# Patient Record
Sex: FEMALE | Race: WHITE | NOT HISPANIC OR LATINO | Employment: FULL TIME | URBAN - METROPOLITAN AREA
[De-identification: names, ages, dates, MRNs, and addresses within clinical notes are randomized per-mention and may not be internally consistent; named-entity substitution may affect disease eponyms.]

---

## 2017-03-17 ENCOUNTER — OFFICE VISIT (OUTPATIENT)
Dept: FAMILY MEDICINE | Facility: CLINIC | Age: 32
End: 2017-03-17
Payer: COMMERCIAL

## 2017-03-17 VITALS
OXYGEN SATURATION: 97 % | TEMPERATURE: 97.2 F | DIASTOLIC BLOOD PRESSURE: 66 MMHG | HEIGHT: 65 IN | SYSTOLIC BLOOD PRESSURE: 108 MMHG | HEART RATE: 59 BPM | WEIGHT: 175.4 LBS | BODY MASS INDEX: 29.22 KG/M2

## 2017-03-17 DIAGNOSIS — M25.511 ARTHRALGIA OF RIGHT ACROMIOCLAVICULAR JOINT: ICD-10-CM

## 2017-03-17 DIAGNOSIS — Z01.419 WELL WOMAN EXAM WITH ROUTINE GYNECOLOGICAL EXAM: Primary | ICD-10-CM

## 2017-03-17 PROCEDURE — G0145 SCR C/V CYTO,THINLAYER,RESCR: HCPCS | Performed by: FAMILY MEDICINE

## 2017-03-17 PROCEDURE — 99395 PREV VISIT EST AGE 18-39: CPT | Performed by: FAMILY MEDICINE

## 2017-03-17 PROCEDURE — 87624 HPV HI-RISK TYP POOLED RSLT: CPT | Performed by: FAMILY MEDICINE

## 2017-03-17 NOTE — NURSING NOTE
"Chief Complaint   Patient presents with     Physical     FASTING       Initial /66  Pulse 59  Temp 97.2  F (36.2  C) (Tympanic)  Ht 5' 5.25\" (1.657 m)  Wt 175 lb 6.4 oz (79.6 kg)  LMP 03/11/2017 (Exact Date)  SpO2 97%  BMI 28.96 kg/m2 Estimated body mass index is 28.96 kg/(m^2) as calculated from the following:    Height as of this encounter: 5' 5.25\" (1.657 m).    Weight as of this encounter: 175 lb 6.4 oz (79.6 kg).  Medication Reconciliation: complete  "

## 2017-03-17 NOTE — PATIENT INSTRUCTIONS
Next physical in 2-3 years I would recommend to do fasting cholesterol and blood sugar.    Right shoulder you are a little tender on the AC joint so may have irritated or over stretched that or the bicep at the elbow.     Preventive Health Recommendations  Female Ages 26 - 39  Yearly exam:   See your health care provider every year in order to    Review health changes.     Discuss preventive care.      Review your medicines if you your doctor has prescribed any.    Until age 30: Get a Pap test every three years (more often if you have had an abnormal result).    After age 30: Talk to your doctor about whether you should have a Pap test every 3 years or have a Pap test with HPV screening every 5 years.   You do not need a Pap test if your uterus was removed (hysterectomy) and you have not had cancer.  You should be tested each year for STDs (sexually transmitted diseases), if you're at risk.   Talk to your provider about how often to have your cholesterol checked.  If you are at risk for diabetes, you should have a diabetes test (fasting glucose).  Shots: Get a flu shot each year. Get a tetanus shot every 10 years.   Nutrition:     Eat at least 5 servings of fruits and vegetables each day.    Eat whole-grain bread, whole-wheat pasta and brown rice instead of white grains and rice.    Talk to your provider about Calcium and Vitamin D.     Lifestyle    Exercise at least 150 minutes a week (30 minutes a day, 5 days of the week). This will help you control your weight and prevent disease.    Limit alcohol to one drink per day.    No smoking.     Wear sunscreen to prevent skin cancer.    See your dentist every six months for an exam and cleaning.        Thank you for choosing [facility].  You may be receiving a survey in the mail from Vitaly Snapkinjericho regarding your visit today.  Please take a few minutes to complete and return the survey to let us know how we are doing.      If you have questions or concerns, please  contact us via [employer] or you can contact your care team at [phone].    Our Clinic hours are:  Monday 6:40 am  to 7:00 pm  Tuesday -Friday 6:40 am to 5:00 pm    The [place] outpatient lab hours are:  Monday - Friday 6:10 am to 4:45 pm  Saturdays 7:00 am to 11:00 am  Appointments are required, call [phone]    If you have clinical questions after hours or would like to schedule an appointment,  call the clinic at [phone].

## 2017-03-17 NOTE — MR AVS SNAPSHOT
After Visit Summary   3/17/2017    Amarilis Espinal    MRN: [id number]           Patient Information     Date Of Birth          1985        Visit Information        Provider Department      3/17/2017 6:40 AM Dmitry Barrett MD [facility]        Today's Diagnoses     Well woman exam with routine gynecological exam    -  1      Care Instructions    Next physical in 2-3 years I would recommend to do fasting cholesterol and blood sugar.    Right shoulder you are a little tender on the AC joint so may have irritated or over stretched that or the bicep at the elbow.     Preventive Health Recommendations  Female Ages 26 - 39  Yearly exam:   See your health care provider every year in order to    Review health changes.     Discuss preventive care.      Review your medicines if you your doctor has prescribed any.    Until age 30: Get a Pap test every three years (more often if you have had an abnormal result).    After age 30: Talk to your doctor about whether you should have a Pap test every 3 years or have a Pap test with HPV screening every 5 years.   You do not need a Pap test if your uterus was removed (hysterectomy) and you have not had cancer.  You should be tested each year for STDs (sexually transmitted diseases), if you're at risk.   Talk to your provider about how often to have your cholesterol checked.  If you are at risk for diabetes, you should have a diabetes test (fasting glucose).  Shots: Get a flu shot each year. Get a tetanus shot every 10 years.   Nutrition:     Eat at least 5 servings of fruits and vegetables each day.    Eat whole-grain bread, whole-wheat pasta and brown rice instead of white grains and rice.    Talk to your provider about Calcium and Vitamin D.     Lifestyle    Exercise at least 150 minutes a week (30 minutes a day, 5 days of the week). This will help you control your weight and prevent disease.    Limit alcohol to one drink per day.    No smoking.      Wear sunscreen to prevent skin cancer.    See your dentist every six months for an exam and cleaning.        Thank you for choosing [facility].  You may be receiving a survey in the mail from Vitaly Zaldivar regarding your visit today.  Please take a few minutes to complete and return the survey to let us know how we are doing.      If you have questions or concerns, please contact us via [employer] or you can contact your care team at [phone].    Our Clinic hours are:  Monday 6:40 am  to 7:00 pm  Tuesday -Friday 6:40 am to 5:00 pm    The [place] outpatient lab hours are:  Monday - Friday 6:10 am to 4:45 pm  Saturdays 7:00 am to 11:00 am  Appointments are required, call [phone]    If you have clinical questions after hours or would like to schedule an appointment,  call the clinic at [phone].        Follow-ups after your visit        Who to contact     If you have questions or need follow up information about today's clinic visit or your schedule please contact [facility] directly at [phone].  Normal or non-critical lab and imaging results will be communicated to you by [employer], letter or phone within 4 business days after the clinic has received the results. If you do not hear from us within 7 days, please contact the clinic through [employer] or phone. If you have a critical or abnormal lab result, we will notify you by phone as soon as possible.  Submit refill requests through [employer] or call your pharmacy and they will forward the refill request to us. Please allow 3 business days for your refill to be completed.          Additional Information About Your Visit        License [employer] Information     [employer] gives you secure access to your electronic health record. If you see a primary care provider, you can also send messages to your care team and make appointments. If you have questions, please call your primary care clinic.  If you do not have a primary care provider, please call  "[phone] and they will assist you.        Care EveryWhere ID     This is your Care EveryWhere ID. This could be used by other organizations to access your [facility] medical records  [id number]        Your Vitals Were     Pulse Temperature Height Last Period Pulse Oximetry BMI (Body Mass Index)    59 97.2  F (36.2  C) (Tympanic) 5' 5.25\" (1.657 m) 03/11/2017 (Exact Date) 97% 28.96 kg/m2       Blood Pressure from Last 3 Encounters:   03/17/17 108/66   08/05/16 119/71   07/25/16 (!) 123/91    Weight from Last 3 Encounters:   03/17/17 175 lb 6.4 oz (79.6 kg)   08/19/16 168 lb (76.2 kg)   08/05/16 164 lb (74.4 kg)              We Performed the Following     HPV High Risk Types DNA Cervical     Pap imaged thin layer screen with HPV - recommended age 30 - 65 years (select HPV order below)        Primary Care Provider Office Phone #    [facility] [phone] [address] [phone]        Thank you!     Thank you for choosing [facility]  for your care. Our goal is always to provide you with excellent care. Hearing back from our patients is one way we can continue to improve our services. Please take a few minutes to complete the written survey that you may receive in the mail after your visit with us. Thank you!             Your Updated Medication List - Protect others around you: Learn how to safely use, store and throw away your medicines at www.disposemymeds.org.          This list is accurate as of: 3/17/17  7:19 AM.  Always use your most recent med list.                   Brand Name Dispense Instructions for use    EPINEPHrine 0.3 MG/0.3ML injection     1 each    Inject 0.3 mLs (0.3 mg) into the muscle once as needed for anaphylaxis       IBUPROFEN PO      Take 200 mg by mouth       IRON SUPPLEMENT PO      Take 325 mg by mouth daily.       OMEGA-3 FISH OIL PO      Take 1 g by mouth daily.       VITAMIN D3 PO      Take by mouth daily         "

## 2017-03-17 NOTE — PROGRESS NOTES
SUBJECTIVE:     CC: Amarilis Espinal is an 31 year old woman who presents for preventive health visit.     Healthy Habits:    Do you get at least three servings of calcium containing foods daily (dairy, green leafy vegetables, etc.)? yes    Amount of exercise or daily activities, outside of work: 5 day(s) per week    Problems taking medications regularly not applicable    Medication side effects: No    Have you had an eye exam in the past two years? yes    Do you see a dentist twice per year? yes    Do you have sleep apnea, excessive snoring or daytime drowsiness?no        PROBLEMS TO ADD ON...pinched nerve in right shoulder, doing crossfit; did acupuncture which helped last fall.  Felt a pull with doing a kip swing on the right antecubital area then after that workout did not hurt then next workout pain in the right wrist volar up to antecubital fossa then large bruise on top of the shoulder the next few days where nothing had hit.     Today's PHQ-2 Score:   PHQ-2 ( 1999 Pfizer) 3/17/2017 7/18/2016   Q1: Little interest or pleasure in doing things 0 0   Q2: Feeling down, depressed or hopeless 0 0   PHQ-2 Score 0 0       Abuse: Current or Past(Physical, Sexual or Emotional)- No  Do you feel safe in your environment - Yes    Social History   Substance Use Topics     Smoking status: Never Smoker     Smokeless tobacco: Never Used     Alcohol use No      Comment: rare-quit with pregnancy     The patient does not drink >3 drinks per day nor >7 drinks per week.    Recent Labs   Lab Test  07/10/12   1421   CHOL  155   LDL  86       Reviewed orders with patient.  Reviewed health maintenance and updated orders accordingly - Yes    Mammo Decision Support:  Mammogram not appropriate for this patient based on age.    Pertinent mammograms are reviewed under the imaging tab.  History of abnormal Pap smear: NO - age 30-65 PAP every 5 years with negative HPV co-testing recommended    Reviewed and updated as needed this visit by  "clinical staff  Tobacco  Allergies  Med Hx  Surg Hx  Fam Hx  Soc Hx        Reviewed and updated as needed this visit by Provider          ROS:  C: NEGATIVE for fever, chills, change in weight  I: NEGATIVE for worrisome rashes, moles or lesions  E: NEGATIVE for vision changes or irritation  ENT: NEGATIVE for ear, mouth and throat problems  R: NEGATIVE for significant cough or SOB  B: NEGATIVE for masses, tenderness or discharge  CV: NEGATIVE for chest pain, palpitations or peripheral edema  GI: NEGATIVE for nausea, abdominal pain, heartburn, or change in bowel habits  : NEGATIVE for unusual urinary or vaginal symptoms.   M: NEGATIVE for significant arthralgias or myalgia  N: NEGATIVE for weakness, dizziness or paresthesias  P: NEGATIVE for changes in mood or affect    Problem list, Medication list, Allergies, and Medical/Social/Surgical histories reviewed in EPIC and updated as appropriate.  OBJECTIVE:     /66  Pulse 59  Temp 97.2  F (36.2  C) (Tympanic)  Ht 5' 5.25\" (1.657 m)  Wt 175 lb 6.4 oz (79.6 kg)  LMP 03/11/2017 (Exact Date)  SpO2 97%  BMI 28.96 kg/m2  EXAM:  GENERAL: healthy, alert and no distress  EYES: Eyes grossly normal to inspection, PERRL and conjunctivae and sclerae normal  HENT: ear canals and TM's normal, nose and mouth without ulcers or lesions  NECK: no adenopathy, no asymmetry, masses, or scars and thyroid normal to palpation  RESP: lungs clear to auscultation - no rales, rhonchi or wheezes  BREAST: normal without masses, tenderness or nipple discharge and no palpable axillary masses or adenopathy  CV: regular rate and rhythm, normal S1 S2, no S3 or S4, no murmur, click or rub, no peripheral edema and peripheral pulses strong  ABDOMEN: soft, nontender, no hepatosplenomegaly, no masses and bowel sounds normal   (female): normal female external genitalia, normal urethral meatus, vaginal mucosa pink, moist, well rugated, and normal cervix/adnexa/uterus without masses or discharge  MS: " "no gross musculoskeletal defects noted, no edema  SKIN: no suspicious lesions or rashes  NEURO: Normal strength and tone, mentation intact and speech normal  PSYCH: mentation appears normal, affect normal/bright    ASSESSMENT/PLAN:     Amarilis was seen today for physical.    Diagnoses and all orders for this visit:    Well woman exam with routine gynecological exam  -     Pap imaged thin layer screen with HPV - recommended age 30 - 65 years (select HPV order below)  -     HPV High Risk Types DNA Cervical    Arthralgia of right acromioclavicular joint: now resolved with rest from activity        COUNSELING:   Reviewed preventive health counseling, as reflected in patient instructions       Regular exercise       Healthy diet/nutrition       Vision screening         reports that she has never smoked. She has never used smokeless tobacco.    Estimated body mass index is 28.96 kg/(m^2) as calculated from the following:    Height as of this encounter: 5' 5.25\" (1.657 m).    Weight as of this encounter: 175 lb 6.4 oz (79.6 kg).   Weight management plan: Discussed healthy diet and exercise guidelines and patient will follow up in 12 months in clinic to re-evaluate. Doing crossfit at least 3 times a week    Counseling Resources:  ATP IV Guidelines  Pooled Cohorts Equation Calculator  Breast Cancer Risk Calculator  FRAX Risk Assessment  ICSI Preventive Guidelines  Dietary Guidelines for Americans, 2010  USDA's MyPlate  ASA Prophylaxis  Lung CA Screening    Dmitry Barrett MD  [facility]  "

## 2017-03-21 LAB
COPATH REPORT: NORMAL
PAP: NORMAL

## 2017-03-22 LAB
FINAL DIAGNOSIS: NORMAL
HPV HR 12 DNA CVX QL NAA+PROBE: NEGATIVE
HPV16 DNA SPEC QL NAA+PROBE: NEGATIVE
HPV18 DNA SPEC QL NAA+PROBE: NEGATIVE
SPECIMEN DESCRIPTION: NORMAL

## 2017-04-08 ENCOUNTER — TELEPHONE (OUTPATIENT)
Dept: NURSING | Facility: CLINIC | Age: 32
End: 2017-04-08

## 2017-04-08 NOTE — TELEPHONE ENCOUNTER
Call Type: Triage Call    Presenting Problem: Amarilis states she is sure she has shingles.  Also, She says that the lymph node in her groin area is swollen. She  says she has a rash that is painful on her right hip, started about  a week ago. Were fluid filled but have dried up without draining or  crusting. Denies fever. Amarilis states that the shingles/rash is  improving and is not bothering her. She is more concerned about the  swollen lymph node.  Triage Note:  Guideline Title: Skin Lesions  Recommended Disposition: See Provider within 24 hours  Original Inclination: Wanted to speak with a nurse  Override Disposition:  Intended Action: See /Make Appt  Physician Contacted: No  New onset of painful blisters without an obvious cause such as thermal or chemical  burns ?  YES  Thermal or chemical burn ? NO  Blisters on mouth OR surrounding area ? NO  Known or suspected exposure to Poison Laya, O[place] OR [place] ? NO  Skin tear(s) caused by friction/shear injury ? NO  Previously confirmed diagnosis of athlete's foot and similar symptoms ? NO  One or more enlarged or tender lymph nodes ? NO  Associated with new onset wheezing or difficulty breathing ? NO  Exposure to , e.g., Super Glue ? NO  Possible exposure to chickenpox or has rash that looks like chickenpox ? NO  Female with any rash, warts or sores on perineal/perianal area. ? NO  Any painful blisters on perineal/perianal area. ? NO  Male with rash, warts, or sores on penis. ? NO  Male with rash, warts, or sores on scrotum/genital area. ? NO  Non-blister lesions on mouth, lip, tongue ? NO  Skin changes that looks like hives (itchy welts or wheals) ? NO  Jaundice (yellowish tint to skin or whites of eyes) that is worsening or not  previously evaluated. ? NO  Large areas of peeling skin or new onset of large blisters over body not related  to sun/UVB exposure ? NO  Foot or toe skin lesions complicated by diagnosed diabetes mellitus. ? NO  Painful lesion(s)  unrelieved with 24 hours of home care measures ? NO  Localized or widespread rash that does not have the appearance of hives (itchy  welts or wheals) ? NO  Any temperature elevation in an immunocompromised individual OR frail elderly with  signs of dehydration ? NO  Any new OR worsening signs and symptoms of soft tissue infection ? NO  Known herpes zoster or undiagnosed blister-like rash on or near eye ? NO  Known herpes zoster or undiagnosed blister-like rash on tip of nose ? NO  Physician Instructions:  Care Advice: Do not touch, scratch, or break blisters.  Intact blisters are  thought to reduce pain and decrease chance of secondary infection. Cover  blisters until they are crusted over. Apply  cool wet compresses to the  affected area for 10-15 minutes to help relieve pain and itching.  Analgesic/Antipyretic Advice - NSAIDs: Consider aspirin, ibuprofen,  naproxen or ketoprofen for pain or fever as directed on label or by  pharmacist/provider. PRECAUTIONS: - You should not take this medicine for  more than 10 days unless recommended by your provider. EXCEPTIONS: - Should  not be used if taking blood thinners or have bleeding problems. - Do not  use if have history of sensitivity/allergy to any of these medications  or history of cardiovascular, ulcer, kidney, liver disease or diabetes  unless approved by provider. - Do not exceed recommended dose or frequency.

## 2017-05-17 ENCOUNTER — HOSPITAL ENCOUNTER (EMERGENCY)
Facility: CLINIC | Age: 32
Discharge: HOME | End: 2017-05-17
Attending: EMERGENCY MEDICINE | Admitting: EMERGENCY MEDICINE
Payer: COMMERCIAL

## 2017-05-17 VITALS
SYSTOLIC BLOOD PRESSURE: 113 MMHG | HEIGHT: 65 IN | OXYGEN SATURATION: 97 % | WEIGHT: 174.25 LBS | BODY MASS INDEX: 29.03 KG/M2 | RESPIRATION RATE: 16 BRPM | DIASTOLIC BLOOD PRESSURE: 66 MMHG | TEMPERATURE: 98.3 F
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
## 2017-05-17 PROCEDURE — 99283 EMERGENCY DEPT VISIT LOW MDM: CPT | Performed by: EMERGENCY MEDICINE

## 2017-05-17 PROCEDURE — 99282 EMERGENCY DEPT VISIT SF MDM: CPT

## 2017-05-17 NOTE — ED AVS SNAPSHOT
[facility] Emergency Department    [address] [address]    Phone:  [phone]    Fax:  [phone]                                       Amarilis Espinal   MRN: [id number]    Department:  [facility] Emergency Department   Date of Visit:  5/17/2017           Patient Information     Date Of Birth          1985        Your diagnoses for this visit were:     Insect bite, initial encounter        You were seen by Fabrizio Monte MD.        Discharge Instructions         Insect Bite  Insects most often bite to protect themselves or their nests. Certain bugs, like fleas, bite to feed. In some cases, the actual bite causes no pain. An itchy red welt or swelling may develop at the site of the bite. Most insect bites do not cause illness. And the itching and swelling most often go away without treatment. However, an infection can develop if the bite is scratched and the skin broken. Rarely, a person may have an allergic reaction to an insect bite.  If a stinger is visible at the bite spot, remove it if possible, as this can decrease the amount of venom that gets into your body. Do not try to dig it out, as you may damage the skin and also increase the chance of infection.     To help reduce swelling and itching, apply ice or a cold pack wrapped in a thin towel.   Home care    Your health care provider may prescribe over-the-counter medicines to help relieve itching and swelling. Use each medicine according to the directions on the package. If the bite becomes infected, you will need an antibiotic. This may be in pill form taken by mouth or as an ointment or cream put directly on the skin. Be sure to use them exactly as prescribed.    Bite symptoms usually go away on their own within a week or two.    To help prevent infection, avoid scratching or picking at the bite.    To help relieve itching and swelling, apply ice wrapped in a thin towel to the bites. Do this for up to 10 minutes  at a time. Avoid hot showers or baths as these tend to make itching worse.    An over-the-counter anti-itch medicine such as calamine lotion or an antihistamine cream may be helpful.    If you suspect you have insects in your home, talk to a licensed pest-control professional. He or she can inspect your home and tell you how to get rid of bugs safely.  Follow-up care  Follow up with your health care provider, or as advised.  Call 911  Call 911 if any of these occur:    Trouble breathing or swallowing    Wheezing    Feeling like your throat is closing up    Fainting, loss of consciousness    Swelling around the face or mouth  When to seek medical advice  Call your health care provider right away if any of these occur:    Fever of 100.4 F (38 C) or higher    Signs of infection, such as increased swelling and pain, warmth, red streaks, or drainage from the skin    Signs of allergic reaction, such as hives, a spreading rash, throat itching    9314-4353 [employer] [address] [place] [address]. All rights reserved. This information is not intended as a substitute for professional medical care. Always follow your healthcare professional's instructions.      Benadryl, drink plenty of fluids, return for trouble swallowing, breathing, swelling of the throat or tongue, vomiting or any other concern.    24 Hour Appointment Hotline       To make an appointment at any [facility], call [phone] ([phone]). If you don't have a family doctor or clinic, we will help you find one. [place] clinics are conveniently located to serve the needs of you and your family.             Review of your medicines      Our records show that you are taking the medicines listed below. If these are incorrect, please call your family doctor or clinic.        Dose / Directions Last dose taken    EPINEPHrine 0.3 MG/0.3ML injection   Dose:  0.3 mg   Quantity:  1 each        Inject 0.3 mLs (0.3 mg) into the  muscle once as needed for anaphylaxis   Refills:  1        IBUPROFEN PO   Dose:  200 mg        Take 200 mg by mouth   Refills:  0        IRON SUPPLEMENT PO   Dose:  325 mg        Take 325 mg by mouth daily.   Refills:  0        OMEGA-3 FISH OIL PO   Dose:  1 g        Take 1 g by mouth daily.   Refills:  0        VITAMIN D3 PO        Take by mouth daily   Refills:  0                Orders Needing Specimen Collection     None      Pending Results     No orders found from 5/15/2017 to 5/18/2017.            Pending Culture Results     No orders found from 5/15/2017 to 5/18/2017.            Pending Results Instructions     If you had any lab results that were not finalized at the time of your Discharge, you can call the ED Lab Result RN at [phone]. You will be contacted by this team for any positive Lab results or changes in treatment. The nurses are available 7 days a week from 10A to 6:30P.  You can leave a message 24 hours per day and they will return your call.        Test Results From Your Hospital Stay               Thank you for choosing [facility]       Thank you for choosing [facility] for your care. Our goal is always to provide you with excellent care. Hearing back from our patients is one way we can continue to improve our services. Please take a few minutes to complete the written survey that you may receive in the mail after you visit with us. Thank you!        [employer] Information     [employer] gives you secure access to your electronic health record. If you see a primary care provider, you can also send messages to your care team and make appointments. If you have questions, please call your primary care clinic.  If you do not have a primary care provider, please call [phone] and they will assist you.        Care EveryWhere ID     This is your Care EveryWhere ID. This could be used by other organizations to access your [facility] medical records  [id number]        After Visit Summary       This is your  record. Keep this with you and show to your community pharmacist(s) and doctor(s) at your next visit.

## 2017-05-17 NOTE — ED AVS SNAPSHOT
[facility] Emergency Department    [address] [facility] [address]    Phone:  [phone]    Fax:  [phone]                                       Amarilis Espinal   MRN: [id number]    Department:  [facility] Emergency Department   Date of Visit:  5/17/2017           After Visit Summary Signature Page     I have received my discharge instructions, and my questions have been answered. I have discussed any challenges I see with this plan with the nurse or doctor.    ..........................................................................................................................................  Patient/Patient Representative Signature      ..........................................................................................................................................  Patient Representative Print Name and Relationship to Patient    ..................................................               ................................................  Date                                            Time    ..........................................................................................................................................  Reviewed by Signature/Title    ...................................................              ..............................................  Date                                                            Time

## 2017-05-18 NOTE — ED NOTES
Mother states that her  daughter has been saying she is going to kill herself today. No past Hx of this. Recent break up wioth a boyfriend. Also recent death ofd a sibling.

## 2017-05-18 NOTE — ED PROVIDER NOTES
"Chief complaint insect sting    31-year-old female stung in the right shoulder by an unknown insect approximately 8:30 this evening.  She describes shooting pains from the area.  She denies any near-syncope, shortness of air, nausea or vomiting.  She has history of anaphylaxis to bee sting in the past.  She did not take any medications after the insect bite.    Past medical history, medications, allergies, social history, family history all reviewed.  Patient Active Problem List   Diagnosis     Migraine headache     Lumbago     AR (allergic rhinitis)     Lactose intolerance     Problem focused review of systems otherwise negative    /81  Temp 98.3  F (36.8  C) (Oral)  Resp 16  Ht 1.651 m (5' 5\")  Wt 79 kg (174 lb 4 oz)  SpO2 98%  BMI 29 kg/m2  Nontoxic appearing no respiratory distress alert and oriented ×3  Head atraumatic normocephalic  Oropharynx moist without lesions or erythema exudate or angioedema  Skin pink warm and dry without rash with the exception of the right shoulder which shows a dime size area of erythema without significant induration, this area is moderately tender.  Lungs clear no rales or wheezes  Heart regular no murmur  Abdomen soft nontender bowel sounds positive    Impression: Insect sting  Plan: Benadryl, drink plenty of fluids, return criteria reviewed.     Fabrizio Monte MD  05/17/17 0000    "

## 2017-05-18 NOTE — DISCHARGE INSTRUCTIONS
Insect Bite  Insects most often bite to protect themselves or their nests. Certain bugs, like fleas, bite to feed. In some cases, the actual bite causes no pain. An itchy red welt or swelling may develop at the site of the bite. Most insect bites do not cause illness. And the itching and swelling most often go away without treatment. However, an infection can develop if the bite is scratched and the skin broken. Rarely, a person may have an allergic reaction to an insect bite.  If a stinger is visible at the bite spot, remove it if possible, as this can decrease the amount of venom that gets into your body. Do not try to dig it out, as you may damage the skin and also increase the chance of infection.     To help reduce swelling and itching, apply ice or a cold pack wrapped in a thin towel.   Home care    Your health care provider may prescribe over-the-counter medicines to help relieve itching and swelling. Use each medicine according to the directions on the package. If the bite becomes infected, you will need an antibiotic. This may be in pill form taken by mouth or as an ointment or cream put directly on the skin. Be sure to use them exactly as prescribed.    Bite symptoms usually go away on their own within a week or two.    To help prevent infection, avoid scratching or picking at the bite.    To help relieve itching and swelling, apply ice wrapped in a thin towel to the bites. Do this for up to 10 minutes at a time. Avoid hot showers or baths as these tend to make itching worse.    An over-the-counter anti-itch medicine such as calamine lotion or an antihistamine cream may be helpful.    If you suspect you have insects in your home, talk to a licensed pest-control professional. He or she can inspect your home and tell you how to get rid of bugs safely.  Follow-up care  Follow up with your health care provider, or as advised.  Call 911  Call 911 if any of these occur:    Trouble breathing or  swallowing    Wheezing    Feeling like your throat is closing up    Fainting, loss of consciousness    Swelling around the face or mouth  When to seek medical advice  Call your health care provider right away if any of these occur:    Fever of 100.4 F (38 C) or higher    Signs of infection, such as increased swelling and pain, warmth, red streaks, or drainage from the skin    Signs of allergic reaction, such as hives, a spreading rash, throat itching    0498-4706 [employer]. [address] [place], [place] [address]. All rights reserved. This information is not intended as a substitute for professional medical care. Always follow your healthcare professional's instructions.      Benadryl, drink plenty of fluids, return for trouble swallowing, breathing, swelling of the throat or tongue, vomiting or any other concern.

## 2017-10-08 ENCOUNTER — HOSPITAL ENCOUNTER (EMERGENCY)
Facility: CLINIC | Age: 32
Discharge: HOME | End: 2017-10-08
Attending: EMERGENCY MEDICINE | Admitting: EMERGENCY MEDICINE
Payer: OTHER GOVERNMENT

## 2017-10-08 VITALS
SYSTOLIC BLOOD PRESSURE: 112 MMHG | WEIGHT: 173 LBS | HEART RATE: 88 BPM | RESPIRATION RATE: 16 BRPM | TEMPERATURE: 98.3 F | OXYGEN SATURATION: 97 % | DIASTOLIC BLOOD PRESSURE: 74 MMHG | BODY MASS INDEX: 28.79 KG/M2

## 2017-10-08 DIAGNOSIS — T78.2XXA ANAPHYLACTIC REACTION TO BEE STING, ACCIDENTAL OR UNINTENTIONAL, INITIAL ENCOUNTER: ICD-10-CM

## 2017-10-08 DIAGNOSIS — T63.441A ANAPHYLACTIC REACTION TO BEE STING, ACCIDENTAL OR UNINTENTIONAL, INITIAL ENCOUNTER: ICD-10-CM

## 2017-10-08 PROCEDURE — 25000132 ZZH RX MED GY IP 250 OP 250 PS 637: Performed by: EMERGENCY MEDICINE

## 2017-10-08 PROCEDURE — 25000125 ZZHC RX 250: Performed by: EMERGENCY MEDICINE

## 2017-10-08 PROCEDURE — 99291 CRITICAL CARE FIRST HOUR: CPT | Mod: Z6 | Performed by: EMERGENCY MEDICINE

## 2017-10-08 PROCEDURE — 25000128 H RX IP 250 OP 636: Performed by: EMERGENCY MEDICINE

## 2017-10-08 PROCEDURE — 96372 THER/PROPH/DIAG INJ SC/IM: CPT | Performed by: EMERGENCY MEDICINE

## 2017-10-08 PROCEDURE — 25000125 ZZHC RX 250

## 2017-10-08 PROCEDURE — 99284 EMERGENCY DEPT VISIT MOD MDM: CPT | Mod: 25 | Performed by: EMERGENCY MEDICINE

## 2017-10-08 RX ORDER — DIPHENHYDRAMINE HYDROCHLORIDE 50 MG/ML
50 INJECTION INTRAMUSCULAR; INTRAVENOUS ONCE
Status: COMPLETED | OUTPATIENT
Start: 2017-10-08 | End: 2017-10-08

## 2017-10-08 RX ORDER — EPINEPHRINE 0.3 MG/.3ML
0.3 INJECTION SUBCUTANEOUS PRN
Qty: 0.6 ML | Refills: 1 | Status: SHIPPED | OUTPATIENT
Start: 2017-10-08

## 2017-10-08 RX ORDER — PREDNISONE 20 MG/1
TABLET ORAL
Qty: 8 TABLET | Refills: 0 | Status: SHIPPED | OUTPATIENT
Start: 2017-10-08 | End: 2018-05-15

## 2017-10-08 RX ORDER — PREDNISONE 20 MG/1
60 TABLET ORAL ONCE
Status: COMPLETED | OUTPATIENT
Start: 2017-10-08 | End: 2017-10-08

## 2017-10-08 RX ORDER — MULTIVITAMIN,THERAPEUTIC
1 TABLET ORAL DAILY
COMMUNITY

## 2017-10-08 RX ADMIN — DIPHENHYDRAMINE HYDROCHLORIDE 50 MG: 50 INJECTION, SOLUTION INTRAMUSCULAR; INTRAVENOUS at 12:19

## 2017-10-08 RX ADMIN — EPINEPHRINE 0.3 MG: 1 INJECTION, SOLUTION, CONCENTRATE INTRAVENOUS at 12:08

## 2017-10-08 RX ADMIN — RANITIDINE HYDROCHLORIDE 150 MG: 150 TABLET, FILM COATED ORAL at 12:21

## 2017-10-08 RX ADMIN — PREDNISONE 60 MG: 20 TABLET ORAL at 12:21

## 2017-10-08 NOTE — ED AVS SNAPSHOT
[facility] Emergency Department    [address] [facility] [address]    Phone:  [phone]    Fax:  [phone]                                       Amarilis Espinal   MRN: [id number]    Department:  [facility] Emergency Department   Date of Visit:  10/8/2017           Patient Information     Date Of Birth          1985        Your diagnoses for this visit were:     Anaphylactic reaction to bee sting, accidental or unintentional, initial encounter        You were seen by Terry Hernandez MD.      Follow-up Information     Schedule an appointment as soon as possible for a visit with [facility].    Specialty:  Allergy    Contact information:    [address] [place] [id number] [phone]    Additional information:    The medical center is located at   [address] [place] [address] (between [facility] and   [address], four miles north   of [place]).        Follow up with [facility] Emergency Department.    Specialty:  EMERGENCY MEDICINE    Why:  If symptoms worsen in anyway    Contact information:    [address] [place] [id number] [phone]    Additional information:    The medical center is located at   [address] [place] [address] (between [facility] and   [address], four [place] north   of [place]).        Discharge Instructions           Continue prednisone for 4 more days, next dose tomorrow morning.    Benadryl 25-50 mg every 6 hours (antihistamine).    May add Zantac 150 minutes twice daily (works with Benadryl).    Use epinephrine pen as directed.    Return if worsening in any way.    Insect Sting Allergy, Generalized  You are having an allergic reaction to an insect sting. This may occur after a sting by a wasp, honeybee, yellow jacket, or other insect. This may cause an itchy rash and swelling in the face or other parts of the body. A more severe reaction may cause you to feel dizzy, faint, or have trouble breathing or  swallowing. Other warning signs are listed below.  Symptoms can include:    Rash, hives, redness, welts, or blisters in areas other than the sting site    Itching, burning, stinging, pain in areas other than the sting site    Dry, flaky, cracking, scaly skin    Swelling in areas other than the sting site     Stomach pain or cramps  More severe symptoms include:    Swelling of the face or lips or drooling    Trouble swallowing, feeling like your throat is closing    Trouble breathing, wheezing    Dizziness or a sudden decrease in blood pressure    Hoarse voice or trouble speaking    Severe nausea, vomiting, or diarrhea    Feeling faint or lightheaded    Rapid heart rate  Home care  Medicine  The healthcare provider may prescribe medicines to relieve swelling, itching, and pain. Follow the provider s instructions when taking these medicines.    If you had a severe reaction, the provider may prescribe an injectable epinephrine kit. Epinephrine will stop the progression of an allergic reaction. Before you leave the hospital, be sure that you understand when and how to use this medicine.    Oral diphenhydramine is an over-the-counter antihistamine available at pharmacies and grocery stores. Unless a prescription antihistamine was given, diphenhydramine may be used to reduce itching if large areas of the skin are involved. It may make you sleepy, so be careful using it in the daytime or when going to school, working, or driving. Note: Don t use diphenhydramine if you have glaucoma or if you are a man with trouble urinating due to an enlarged prostate. There are other antihistamines that cause less drowsiness and are good choices for daytime use. Ask your pharmacist for suggestions.    Don t use diphenhydramine cream on your skin. It can cause a further reaction in some people.    Calamine lotion or oatmeal baths sometimes help with itching.    You may use acetaminophen or ibuprofen to control pain, unless another pain  medicine was prescribed. Note: If you have chronic liver or kidney disease or ever had a stomach ulcer or gastrointestinal bleeding, talk with your provider before using these medicines.    General care  Avoid tight clothing and things that heat up your skin (such as hot showers or baths, or direct sunlight). Heat makes the itching worse.  An ice pack will relieve local areas of intense itching and redness. Apply 5 to 10 minutes. To make an ice pack, put ice cubes in a plastic bag that seals at the top. Wrap the bag in a clean, thin towel or cloth. Don t put ice directly on the skin.  Ticks  If you try to remove a tick, do the following:    Use a set of fine tweezers and  the tick as close to the skin as is possible.    Pull upwards, using even, steady pressure. Don t jerk or twist the tick. The tick s bodily fluids may contain infection-causing organisms. So don t squeeze, crush, or puncture the body of the tick. Don t use a smoldering match or cigarette, nail polish, petroleum jelly, liquid soap, or kerosene. They may irritate the tick.    If any mouthparts of the tick remain in the skin, these can be removed with tweezers. If you can t remove the mouth (of a tick) easily with clean tweezers, leave it alone and let the skin heal.    After the tick is removed, wash the bite area with rubbing alcohol, iodine, or soap and water.    Put the tick in a sealed container and completely cover it with alcohol. Never try to kill or crush a tick with your hand or fingers.  Stings  Wasps, yellow jackets, and hornets don t leave a stinger behind. But if a honeybee stings you, a stinger may stay in your skin. The stinger of a honeybee releases a substance that will attract other bees to you. So try to move away from the nest immediately. Once you are away from the nest, then remove the stinger as quickly as possible by:    Scraping the stinger out with the edge of a dull knife or plastic card (credit card).    Don't use a  tweezer or your fingers to remove the stinger since that may squeeze more toxin from the stinger.    Wash the affected area with soap and warm water 2 to 3 times a day. Don't break a blister, if present.    Next apply an ice pack for 5 to 10 minutes. To make an ice pack, put ice cubes in a plastic bag that seals at the top. Wrap the bag in a clean, thin towel or cloth. Don t put ice directly on the skin.    Contact your healthcare provider and ask what can be used to help decrease the swelling and itching to the affected area.     To prevent an infection, don't scratch the affected areas. Always check the sting area for signs of an infection: increased redness, swelling, or pain to the affected area.  Preventing future reactions  Future reactions could be worse than this one. So try to avoid situations where you might be stung:    Don't walk in grass without shoes. Avoid wearing sandals.    Don't leave food uncovered when eating outside. Sweet treats, watermelon, and ice cream attract insects.    Don't drink from uncovered sweetened drinks in cans when outside. Insects are attracted to soda drink cans and sometimes crawl inside of them.    Don't wear bright colored clothes with flowery prints and patterns when outside.    Don t wear perfume when outside. Smell attracts insects.    Wear long pants, long-sleeved shirts, socks, and work gloves when working outside.    Be aware that honeybees nest in trees. Wasps and yellow jackets nest in the ground, trees or roof eaves. Avoid garbage cans when outside.  Auto-injectable epinephrine    If you are at high risk for another sting due to where you work or play, or if your reaction included dizziness, fainting or trouble breathing or swallowing, an auto-injectable epinephrine may be prescribed. If not, ask your healthcare provider for one and always carry it with you. Learn how to use the device. If you begin to feel the symptoms of another reaction in the future, use the  auto-injectable epinephrine to inject yourself, and then call 911. Don't wait until symptoms become severe.     Remember that the auto-injectable epinephrine is a rescue medicine only. You still need someone to take you to the hospital or call 911 after you have received the medicine.  Follow-up care  Follow up with your healthcare provider, or as advised if your symptoms do not continue to improve.  Call 911  Call 911 if any of these occur:    Trouble breathing or swallowing, wheezing     Cool, moist, pale skin    Hoarse voice or trouble speaking    Confused    Very drowsy or trouble waking up    Fainting or loss of consciousness    Rapid heart rate    Low blood pressure or feeling dizzy or weak    Feeling of doom    Severe nausea, vomiting, or diarrhea    Seizure    Swelling in the face, eyelids, lips, mouth, throat or tongue    Drooling  When to seek medical advice  Call your healthcare provider right away if any of the following occur:    Spreading areas of itching, redness or swelling    Headache, fever, chills, muscle or joint aching    Increased pain or swelling    Signs of infection of the affected area:    Spreading redness    Increase in pain or swelling    Fluid or colored drainage from the affected site  Date Last Reviewed: 3/1/2017    1199-7657 [employer]. [address] [place] [address]. All rights reserved. This information is not intended as a substitute for professional medical care. Always follow your healthcare professional's instructions.          Discharge References/Attachments     ANAPHYLAXIS, GENERAL (ENGLISH)    HIVES (URTICARIA) UNDERSTANDING (ENGLISH)      24 Hour Appointment Hotline       To make an appointment at any [facility], call [phone] ([phone]). If you don't have a family doctor or clinic, we will help you find one. [facility] are conveniently located to serve the needs of you and your family.             Review of your medicines       START taking        Dose / Directions Last dose taken    predniSONE 20 MG tablet   Commonly known as:  DELTASONE   Quantity:  8 tablet        Take two tablets (= 40mg) each day for 4 days   Refills:  0          Our records show that you are taking the medicines listed below. If these are incorrect, please call your family doctor or clinic.        Dose / Directions Last dose taken    IRON SUPPLEMENT PO   Dose:  325 mg        Take 325 mg by mouth daily.   Refills:  0        multivitamin, therapeutic Tabs tablet   Dose:  1 tablet        Take 1 tablet by mouth daily   Refills:  0        OMEGA-3 FISH OIL PO   Dose:  1 g        Take 1 g by mouth daily.   Refills:  0        VITAMIN D3 PO   Dose:  1000 Units        Take 1,000 Units by mouth daily   Refills:  0          ASK your doctor about these medications        Dose / Directions Last dose taken    * EPINEPHrine 0.3 MG/0.3ML injection 2-pack   Commonly known as:  EPIPEN/ADRENACLICK/or ANY BX GENERIC EQUIV   Dose:  0.3 mg   What changed:  Another medication with the same name was added. Make sure you understand how and when to take each.   Quantity:  1 each   Ask about: Which instructions should I use?        Inject 0.3 mLs (0.3 mg) into the muscle once as needed for anaphylaxis   Refills:  1        * EPINEPHrine 0.3 MG/0.3ML injection 2-pack   Commonly known as:  EPIPEN/ADRENACLICK/or ANY BX GENERIC EQUIV   Dose:  0.3 mg   What changed:  You were already taking a medication with the same name, and this prescription was added. Make sure you understand how and when to take each.   Quantity:  0.6 mL   Ask about: Which instructions should I use?        Inject 0.3 mLs (0.3 mg) into the muscle as needed for anaphylaxis (severe allergic reaction)   Refills:  1        * Notice:  This list has 2 medication(s) that are the same as other medications prescribed for you. Read the directions carefully, and ask your doctor or other care provider to review them with you.             Prescriptions were sent or printed at these locations (2 Prescriptions)                   [facility] [place] [address] [facility]   [address] [place], [place] [address]    Telephone:  [phone]   Fax:  [phone]   Hours:                  E-Prescribed (2 of 2)         predniSONE (DELTASONE) 20 MG tablet               EPINEPHrine (EPIPEN/ADRENACLICK/OR ANY BX GENERIC EQUIV) 0.3 MG/0.3ML injection 2-pack                Orders Needing Specimen Collection     None      Pending Results     No orders found from 10/6/2017 to 10/9/2017.            Pending Culture Results     No orders found from 10/6/2017 to 10/9/2017.            Pending Results Instructions     If you had any lab results that were not finalized at the time of your Discharge, you can call the ED Lab Result RN at [phone]. You will be contacted by this team for any positive Lab results or changes in treatment. The nurses are available 7 days a week from 10A to 6:30P.  You can leave a message 24 hours per day and they will return your call.        Test Results From Your Hospital Stay               Thank you for choosing [facility]       Thank you for choosing [facility] for your care. Our goal is always to provide you with excellent care. Hearing back from our patients is one way we can continue to improve our services. Please take a few minutes to complete the written survey that you may receive in the mail after you visit with us. Thank you!        [facility] Information     [facility] gives you secure access to your electronic health record. If you see a primary care provider, you can also send messages to your care team and make appointments. If you have questions, please call your primary care clinic.  If you do not have a primary care provider, please call [phone] and they will assist you.        Care EveryWhere ID     This is your Care EveryWhere ID. This could be used by other organizations to access your [facility]  records  [id number]        Equal Access to Services     EBER CLARK : Yarelis Yee, cathy acevedo, shonda rojo. So [facility] [phone].    ATENCIÓN: Si habla español, tiene a groves disposición servicios gratuitos de asistencia lingüística. Llame al [phone].    We comply with applicable federal civil rights laws and [place] laws. We do not discriminate on the basis of race, color, national origin, age, disability, sex, sexual orientation, or gender identity.            After Visit Summary       This is your record. Keep this with you and show to your community pharmacist(s) and doctor(s) at your next visit.

## 2017-10-08 NOTE — ED AVS SNAPSHOT
[facility] Emergency Department    [address] [facility] [address]    Phone:  [phone]    Fax:  [phone]                                       Amarilis Espinal   MRN: [id number]    Department:  [facility] Emergency Department   Date of Visit:  10/8/2017           After Visit Summary Signature Page     I have received my discharge instructions, and my questions have been answered. I have discussed any challenges I see with this plan with the nurse or doctor.    ..........................................................................................................................................  Patient/Patient Representative Signature      ..........................................................................................................................................  Patient Representative Print Name and Relationship to Patient    ..................................................               ................................................  Date                                            Time    ..........................................................................................................................................  Reviewed by Signature/Title    ...................................................              ..............................................  Date                                                            Time

## 2017-10-08 NOTE — ED NOTES
Urticaria resolved, pt reports breathing much better, rhinitis has resolved. Will continue to monitor.

## 2017-10-08 NOTE — ED PROVIDER NOTES
History     Chief Complaint   Patient presents with     Allergic Reaction     bee sting, allergic. No EPI pen. Now with hives, nasal congestion.      HPI   Called by RN to emergently evaluate patient with anaphylactic reaction to a bee sting.  Amarilis Espinal is a 32 year old female with an allergic reaction to a bee sting on the posterior left shoulder at ~ 1050 am this am.  Shortly after she developed itching of the anterior neck and upper chest.  This was followed by development of diffuse urticaria and lower facial angioedema. She has nasal congestion but no tongue or oropharyngeal swelling or dysphagia.  No voice change abnormal breath sounds or wheezing.  No chest pain or shortness breath.  She has lower abdominal pain similar to menstrual cramps.  No nausea or vomiting.  She did not take anything for the allergic reaction.  She is out of Benadryl and was afraid to use her epinephrine pen.  No other acute complaints.  Prior reaction to bee stings have been benign localized swelling.    I have reviewed the Medications, Allergies, Past Medical and Surgical History, and Social History in the Epic system.  Patient Active Problem List   Diagnosis     Migraine headache     Lumbago     AR (allergic rhinitis)     Lactose intolerance     Past Medical History:   Diagnosis Date     Arthritis     hands, knees     Chickenpox      Esophageal reflux      Headache 7/12/2010     Intestinal disaccharidase deficiencies and disaccharide malabsorption     Lactose Intolerant     Migraines      Other specified anemias     Hx of Anemia     PONV (postoperative nausea and vomiting)      Past Surgical History:   Procedure Laterality Date     ARTHROSCOPY KNEE RT/LT  1/2005    Left knee surgery     AS HYSTEROS W PERMANENT FALLOPAIN IMPLANT  2014    [facility]     C ORAL SURGERY PROCEDURE  age 18    [place]     DILATION AND CURETTAGE SUCTION  12/30/2010    DILATION AND CURETTAGE SUCTION performed by HENRIQUE RODRIGUEZ at [facility]      TONSILLECTOMY Bilateral 7/25/2016    Procedure: TONSILLECTOMY;  Surgeon: Bradley Medrano MD;  Location: [facility]     No current facility-administered medications for this encounter.      Current Outpatient Prescriptions   Medication     multivitamin, therapeutic (THERA-VIT) TABS tablet     predniSONE (DELTASONE) 20 MG tablet     EPINEPHrine (EPIPEN/ADRENACLICK/OR ANY BX GENERIC EQUIV) 0.3 MG/0.3ML injection 2-pack     Cholecalciferol (VITAMIN D3 PO)     Ferrous Sulfate (IRON SUPPLEMENT PO)     Omega-3 Fatty Acids (OMEGA-3 FISH OIL PO)     EPINEPHrine (EPIPEN) 0.3 MG/0.3ML injection     Allergies   Allergen Reactions     Adhesive Tape Dermatitis     Beef-Derived Products GI Disturbance     Bees Hives     Swelling       Lactose Diarrhea     Morphine Nausea and Vomiting     Soy Allergy GI Disturbance     IBS      Social History   Substance Use Topics     Smoking status: Never Smoker     Smokeless tobacco: Never Used     Alcohol use No      Comment: rare-quit with pregnancy     Family History   Problem Relation Age of Onset     Breast Cancer Maternal Grandmother 55     Thyroid Disease Maternal Grandmother      Arthritis Maternal Grandmother      Alzheimer Disease Maternal Grandmother      Gynecology Maternal Grandmother      endometriosis     OSTEOPOROSIS Maternal Grandmother      DIABETES Paternal Grandfather      Depression Paternal Grandfather      Hypertension Mother      Gynecology Mother      endometriosis     HEART DISEASE Maternal Grandfather      MI     CANCER Maternal Grandfather      leukemia     Cardiovascular Maternal Grandfather      Hypertension Maternal Grandfather      Gynecology Paternal Grandmother      endometriosis     Eye Disorder Paternal Grandmother      Obesity Father      Cancer - colorectal No family hx of        Review of Systems  As mentioned above in the history present illness.  All other systems were reviewed and are negative.    Physical Exam   BP: 128/84  Pulse: 88  Temp: 98.3  F (36.8   C)  Resp: 16  Weight: 78.5 kg (173 lb)  SpO2: 95 %    Physical Exam   Constitutional: She is oriented to person, place, and time. She appears well-developed and well-nourished. No distress.   HENT:   Head: Normocephalic and atraumatic.   Mouth/Throat: Uvula is midline, oropharynx is clear and moist and mucous membranes are normal. No uvula swelling. No posterior oropharyngeal edema or posterior oropharyngeal erythema.   Mild lower facial angioedema of the infraorbital and cheek areas; no lip, tongue or oropharyngeal angioedema.   Eyes: Conjunctivae and EOM are normal. No scleral icterus.   Neck: Normal range of motion. Neck supple. No tracheal deviation present. No thyromegaly present.   Cardiovascular: Normal rate, regular rhythm and normal heart sounds.  Exam reveals no gallop and no friction rub.    No murmur heard.  Pulmonary/Chest: Effort normal and breath sounds normal. No stridor. No respiratory distress. She has no wheezes. She has no rales.   Abdominal: Soft. Bowel sounds are normal. She exhibits no distension and no mass. There is tenderness (mild nonlocalized lower abdominal tenderness). There is no rebound and no guarding.   Musculoskeletal: Normal range of motion. She exhibits no edema.   Neurological: She is alert and oriented to person, place, and time.   Skin: Skin is warm and dry. Rash (generalized urticarial rash, most prominent in the area of the bee sting in the left posterior shoulder; no foreign body/stinger) noted. She is not diaphoretic. No erythema. No pallor.   Psychiatric: She has a normal mood and affect. Her behavior is normal.   Nursing note and vitals reviewed.      ED Course     ED Course     Procedures             Labs Ordered and Resulted from Time of ED Arrival Up to the Time of Departure from the ED - No data to display    Medications   diphenhydrAMINE (BENADRYL) injection 50 mg (50 mg Intramuscular Given 10/8/17 1219)   predniSONE (DELTASONE) tablet 60 mg (60 mg Oral Given  10/8/17 1221)   ranitidine (ZANTAC) tablet 150 mg (150 mg Oral Given 10/8/17 1221)   EPINEPHrine (ADRENALIN) kit 0.3 mg (0.3 mg Intramuscular Given 10/8/17 1208)     1:23 PM - improved  after meds.  Now sleeping comfortably.  Will continue to observe and monitor.     2:20 PM - markedly improved, angioedema resolved, urticaria nearly resolved.    Critical care time, excluding procedures (no procedures performed): 35 minutes    Assessments & Plan (with Medical Decision Making)   32-year-old female with anaphylactic reaction to a bee sting.  Symptoms are essentially resolved after treatment in the ED which included epinephrine, Benadryl, prednisone, Zantac.  Patient is comfortable with discharge home with her significant other with careful monitoring for rebound symptoms, which she understands could occur.  She will continue Benadryl, prednisone and Zantac.  Epinephrine pen was refilled and she was encouraged to use this and return prn.  Do not use or epinephrine pen earlier today because she was afraid to do so (although the epinephrine pen was ). Patient was provided instructions for supportive care and will return as needed for worsened condition or worsening symptoms, or new problems or concerns.  I recommended follow-up and allergy clinic.     I have reviewed the nursing notes.    I have reviewed the findings, diagnosis, plan and need for follow up with the patient.    Discharge Medication List as of 10/8/2017  2:40 PM      START taking these medications    Details   predniSONE (DELTASONE) 20 MG tablet Take two tablets (= 40mg) each day for 4 days, Disp-8 tablet, R-0, E-Prescribe      !! EPINEPHrine (EPIPEN/ADRENACLICK/OR ANY BX GENERIC EQUIV) 0.3 MG/0.3ML injection 2-pack Inject 0.3 mLs (0.3 mg) into the muscle as needed for anaphylaxis (severe allergic reaction), Disp-0.6 mL, R-1, E-Prescribe       !! - Potential duplicate medications found. Please discuss with provider.          Final diagnoses:    Anaphylactic reaction to bee sting, accidental or unintentional, initial encounter       10/8/2017   [facility] EMERGENCY DEPARTMENT       Terry Hernandez MD  10/08/17 0266

## 2017-10-08 NOTE — DISCHARGE INSTRUCTIONS
Continue prednisone for 4 more days, next dose tomorrow morning.    Benadryl 25-50 mg every 6 hours (antihistamine).    May add Zantac 150 minutes twice daily (works with Benadryl).    Use epinephrine pen as directed.    Return if worsening in any way.    Insect Sting Allergy, Generalized  You are having an allergic reaction to an insect sting. This may occur after a sting by a wasp, honeybee, yellow jacket, or other insect. This may cause an itchy rash and swelling in the face or other parts of the body. A more severe reaction may cause you to feel dizzy, faint, or have trouble breathing or swallowing. Other warning signs are listed below.  Symptoms can include:    Rash, hives, redness, welts, or blisters in areas other than the sting site    Itching, burning, stinging, pain in areas other than the sting site    Dry, flaky, cracking, scaly skin    Swelling in areas other than the sting site     Stomach pain or cramps  More severe symptoms include:    Swelling of the face or lips or drooling    Trouble swallowing, feeling like your throat is closing    Trouble breathing, wheezing    Dizziness or a sudden decrease in blood pressure    Hoarse voice or trouble speaking    Severe nausea, vomiting, or diarrhea    Feeling faint or lightheaded    Rapid heart rate  Home care  Medicine  The healthcare provider may prescribe medicines to relieve swelling, itching, and pain. Follow the provider s instructions when taking these medicines.    If you had a severe reaction, the provider may prescribe an injectable epinephrine kit. Epinephrine will stop the progression of an allergic reaction. Before you leave the hospital, be sure that you understand when and how to use this medicine.    Oral diphenhydramine is an over-the-counter antihistamine available at pharmacies and grocery stores. Unless a prescription antihistamine was given, diphenhydramine may be used to reduce itching if large areas of the skin are involved. It may  make you sleepy, so be careful using it in the daytime or when going to school, working, or driving. Note: Don t use diphenhydramine if you have glaucoma or if you are a man with trouble urinating due to an enlarged prostate. There are other antihistamines that cause less drowsiness and are good choices for daytime use. Ask your pharmacist for suggestions.    Don t use diphenhydramine cream on your skin. It can cause a further reaction in some people.    Calamine lotion or oatmeal baths sometimes help with itching.    You may use acetaminophen or ibuprofen to control pain, unless another pain medicine was prescribed. Note: If you have chronic liver or kidney disease or ever had a stomach ulcer or gastrointestinal bleeding, talk with your provider before using these medicines.    General care  Avoid tight clothing and things that heat up your skin (such as hot showers or baths, or direct sunlight). Heat makes the itching worse.  An ice pack will relieve local areas of intense itching and redness. Apply 5 to 10 minutes. To make an ice pack, put ice cubes in a plastic bag that seals at the top. Wrap the bag in a clean, thin towel or cloth. Don t put ice directly on the skin.  Ticks  If you try to remove a tick, do the following:    Use a set of fine tweezers and  the tick as close to the skin as is possible.    Pull upwards, using even, steady pressure. Don t jerk or twist the tick. The tick s bodily fluids may contain infection-causing organisms. So don t squeeze, crush, or puncture the body of the tick. Don t use a smoldering match or cigarette, nail polish, petroleum jelly, liquid soap, or kerosene. They may irritate the tick.    If any mouthparts of the tick remain in the skin, these can be removed with tweezers. If you can t remove the mouth (of a tick) easily with clean tweezers, leave it alone and let the skin heal.    After the tick is removed, wash the bite area with rubbing alcohol, iodine, or soap and  water.    Put the tick in a sealed container and completely cover it with alcohol. Never try to kill or crush a tick with your hand or fingers.  Stings  Wasps, yellow jackets, and hornets don t leave a stinger behind. But if a honeybee stings you, a stinger may stay in your skin. The stinger of a honeybee releases a substance that will attract other bees to you. So try to move away from the nest immediately. Once you are away from the nest, then remove the stinger as quickly as possible by:    Scraping the stinger out with the edge of a dull knife or plastic card (credit card).    Don't use a tweezer or your fingers to remove the stinger since that may squeeze more toxin from the stinger.    Wash the affected area with soap and warm water 2 to 3 times a day. Don't break a blister, if present.    Next apply an ice pack for 5 to 10 minutes. To make an ice pack, put ice cubes in a plastic bag that seals at the top. Wrap the bag in a clean, thin towel or cloth. Don t put ice directly on the skin.    Contact your healthcare provider and ask what can be used to help decrease the swelling and itching to the affected area.     To prevent an infection, don't scratch the affected areas. Always check the sting area for signs of an infection: increased redness, swelling, or pain to the affected area.  Preventing future reactions  Future reactions could be worse than this one. So try to avoid situations where you might be stung:    Don't walk in grass without shoes. Avoid wearing sandals.    Don't leave food uncovered when eating outside. Sweet treats, watermelon, and ice cream attract insects.    Don't drink from uncovered sweetened drinks in cans when outside. Insects are attracted to soda drink cans and sometimes crawl inside of them.    Don't wear bright colored clothes with flowery prints and patterns when outside.    Don t wear perfume when outside. Smell attracts insects.    Wear long pants, long-sleeved shirts, socks,  and work gloves when working outside.    Be aware that honeybees nest in trees. Wasps and yellow jackets nest in the ground, trees or roof eaves. Avoid garbage cans when outside.  Auto-injectable epinephrine    If you are at high risk for another sting due to where you work or play, or if your reaction included dizziness, fainting or trouble breathing or swallowing, an auto-injectable epinephrine may be prescribed. If not, ask your healthcare provider for one and always carry it with you. Learn how to use the device. If you begin to feel the symptoms of another reaction in the future, use the auto-injectable epinephrine to inject yourself, and then call 911. Don't wait until symptoms become severe.     Remember that the auto-injectable epinephrine is a rescue medicine only. You still need someone to take you to the hospital or call 911 after you have received the medicine.  Follow-up care  Follow up with your healthcare provider, or as advised if your symptoms do not continue to improve.  Call 911  Call 911 if any of these occur:    Trouble breathing or swallowing, wheezing     Cool, moist, pale skin    Hoarse voice or trouble speaking    Confused    Very drowsy or trouble waking up    Fainting or loss of consciousness    Rapid heart rate    Low blood pressure or feeling dizzy or weak    Feeling of doom    Severe nausea, vomiting, or diarrhea    Seizure    Swelling in the face, eyelids, lips, mouth, throat or tongue    Drooling  When to seek medical advice  Call your healthcare provider right away if any of the following occur:    Spreading areas of itching, redness or swelling    Headache, fever, chills, muscle or joint aching    Increased pain or swelling    Signs of infection of the affected area:    Spreading redness    Increase in pain or swelling    Fluid or colored drainage from the affected site  Date Last Reviewed: 3/1/2017    [id number] [employer]. [address] [place] [address], [place] [address].  All rights reserved. This information is not intended as a substitute for professional medical care. Always follow your healthcare professional's instructions.

## 2018-05-15 ENCOUNTER — OFFICE VISIT (OUTPATIENT)
Dept: FAMILY MEDICINE | Facility: CLINIC | Age: 33
End: 2018-05-15
Payer: OTHER GOVERNMENT

## 2018-05-15 VITALS
BODY MASS INDEX: 30.42 KG/M2 | DIASTOLIC BLOOD PRESSURE: 66 MMHG | TEMPERATURE: 98.1 F | WEIGHT: 182.6 LBS | HEIGHT: 65 IN | HEART RATE: 57 BPM | SYSTOLIC BLOOD PRESSURE: 112 MMHG

## 2018-05-15 DIAGNOSIS — R10.32 ABDOMINAL PAIN, LEFT LOWER QUADRANT: Primary | ICD-10-CM

## 2018-05-15 LAB
ALBUMIN SERPL-MCNC: 3.9 G/DL (ref 3.4–5)
ALP SERPL-CCNC: 56 U/L (ref 40–150)
ALT SERPL W P-5'-P-CCNC: 28 U/L (ref 0–50)
ANION GAP SERPL CALCULATED.3IONS-SCNC: 5 MMOL/L (ref 3–14)
AST SERPL W P-5'-P-CCNC: 22 U/L (ref 0–45)
BASOPHILS # BLD AUTO: 0.2 10E9/L (ref 0–0.2)
BASOPHILS NFR BLD AUTO: 1.3 %
BILIRUB SERPL-MCNC: 0.7 MG/DL (ref 0.2–1.3)
BUN SERPL-MCNC: 20 MG/DL (ref 7–30)
CALCIUM SERPL-MCNC: 8.7 MG/DL (ref 8.5–10.1)
CHLORIDE SERPL-SCNC: 102 MMOL/L (ref 94–109)
CO2 SERPL-SCNC: 29 MMOL/L (ref 20–32)
CREAT SERPL-MCNC: 0.94 MG/DL (ref 0.52–1.04)
DIFFERENTIAL METHOD BLD: ABNORMAL
EOSINOPHIL # BLD AUTO: 0.2 10E9/L (ref 0–0.7)
EOSINOPHIL NFR BLD AUTO: 1.7 %
ERYTHROCYTE [DISTWIDTH] IN BLOOD BY AUTOMATED COUNT: 11.9 % (ref 10–15)
GFR SERPL CREATININE-BSD FRML MDRD: 68 ML/MIN/1.7M2
GLUCOSE SERPL-MCNC: 82 MG/DL (ref 70–99)
HCT VFR BLD AUTO: 38.6 % (ref 35–47)
HGB BLD-MCNC: 13.1 G/DL (ref 11.7–15.7)
LYMPHOCYTES # BLD AUTO: 2.9 10E9/L (ref 0.8–5.3)
LYMPHOCYTES NFR BLD AUTO: 25.4 %
MCH RBC QN AUTO: 30.9 PG (ref 26.5–33)
MCHC RBC AUTO-ENTMCNC: 33.9 G/DL (ref 31.5–36.5)
MCV RBC AUTO: 91 FL (ref 78–100)
MONOCYTES # BLD AUTO: 0.8 10E9/L (ref 0–1.3)
MONOCYTES NFR BLD AUTO: 6.5 %
NEUTROPHILS # BLD AUTO: 7.5 10E9/L (ref 1.6–8.3)
NEUTROPHILS NFR BLD AUTO: 65.1 %
PLATELET # BLD AUTO: 256 10E9/L (ref 150–450)
POTASSIUM SERPL-SCNC: 3.6 MMOL/L (ref 3.4–5.3)
PROT SERPL-MCNC: 7.5 G/DL (ref 6.8–8.8)
RBC # BLD AUTO: 4.24 10E12/L (ref 3.8–5.2)
SODIUM SERPL-SCNC: 136 MMOL/L (ref 133–144)
WBC # BLD AUTO: 11.6 10E9/L (ref 4–11)

## 2018-05-15 PROCEDURE — 99214 OFFICE O/P EST MOD 30 MIN: CPT | Performed by: FAMILY MEDICINE

## 2018-05-15 PROCEDURE — 80053 COMPREHEN METABOLIC PANEL: CPT | Performed by: FAMILY MEDICINE

## 2018-05-15 PROCEDURE — 85025 COMPLETE CBC W/AUTO DIFF WBC: CPT | Performed by: FAMILY MEDICINE

## 2018-05-15 PROCEDURE — 36415 COLL VENOUS BLD VENIPUNCTURE: CPT | Performed by: FAMILY MEDICINE

## 2018-05-15 ASSESSMENT — PAIN SCALES - GENERAL: PAINLEVEL: MODERATE PAIN (4)

## 2018-05-15 NOTE — NURSING NOTE
"Initial /66  Pulse 57  Temp 98.1  F (36.7  C) (Tympanic)  Ht 5' 4.75\" (1.645 m)  Wt 182 lb 9.6 oz (82.8 kg)  BMI 30.62 kg/m2 Estimated body mass index is 30.62 kg/(m^2) as calculated from the following:    Height as of this encounter: 5' 4.75\" (1.645 m).    Weight as of this encounter: 182 lb 9.6 oz (82.8 kg). .      "

## 2018-05-15 NOTE — MR AVS SNAPSHOT
After Visit Summary   5/15/2018    Amarilis Espinal    MRN: [id number]           Patient Information     Date Of Birth          1985        Visit Information        Provider Department      5/15/2018 3:20 PM Isaiah Hardy MD [facility]        Today's Diagnoses     Abdominal pain, left lower quadrant    -  1      Care Instructions    Go to Clinic B now for your blood draw.  You will be contacted in 24 hours for results of your lab tests.    You preferred to defer CT scan of the abdomen/pelvis until after the above are done.    To schedule the CT scan, call [phone].    If pain is severe, you have fever, or other worrisome symptoms, you should be brought to the ER.          *Abdominal Pain, Unknown Cause (Female)    The exact cause of your abdominal (stomach) pain is not certain. This does not mean that this is something to worry about, or the right tests were not done. Everyone likes to know the exact cause of the problem, but sometimes with abdominal pain, there is no clear-cut cause, and this could be a good thing. The good news is that your symptoms can be treated, and you will feel better.   Your condition does not seem serious now; however, sometimes the signs of a serious problem may take more time to appear. For this reason, it is important for you to watch for any new symptoms, problems, or worsening of your condition.  Over the next few days, the abdominal pain may come and go, or be continuous. Other common symptoms can include nausea and vomiting. Sometimes it can be difficult to tell if you feel nauseous, you may just feel bad and not associate that feeling with nausea. Constipation, diarrhea, and a fever may go along with the pain.  The pain may continue even if treated correctly over the following days. Depending on how things go, sometimes the cause can become clear and may require further or different treatment. Additional evaluations, medications, or  tests may be needed.  Home care  Your health care provider may prescribe medications for pain, symptoms, or an infection.  Follow the health care provider's instructions for taking these medications.  General care    Rest until your next exam. No strenuous activities.    Try to find positions that ease discomfort. A small pillow placed on the abdomen may help relieve pain.    Something warm on your abdomen (such as a heating pad) may help, but be careful not to burn yourself.  Diet    Do not force yourself to eat, especially if having cramps, vomiting, or diarrhea.    Water is important so you do not get dehydrated. Soup may also be good. Sports drinks may also help, especially if they are not too acidic. Make sure you don't drink sugary drinks as this can make things worse. Take liquids in small amounts. Do not guzzle them.    Caffeine sometimes makes the pain and cramping worse.    Avoid dairy products if you have vomiting or diarrhea.    Don't eat large amounts at a time. Wait a few minutes between bites.    Eat a diet low in fiber (called a low-residue diet). Foods allowed include refined breads, white rice, fruit and vegetable juices without pulp, tender meats. These foods will pass more easily through the intestine.    Avoid fried or fatty foods, dairy, alcohol and spicy foods until your symptoms go away.  Follow-up care  Follow up with your health care provider as instructed, or if your pain does not begin to improve in the next 24 hours.  When to seek medical care  Seek prompt medical care if any of the following occur:    Pain gets worse or moves to the right lower abdomen    New or worsening vomiting or diarrhea    Swelling of the abdomen    Unable to pass stool for more than three days    New fever over 101  F (38.3 C), or rising fever    Blood in vomit or bowel movements (dark red or black color)    Jaundice (yellow color of eyes and skin)    Weakness, dizziness    Chest, arm, back, neck or jaw  pain    Unexpected vaginal bleeding or missed period  Call 911  Call emergency services if any of the following occur:    Trouble breathing    Confusion    Fainting or loss of consciousness    Rapid heart rate    Seizure    [id number] Rosemary Cruz, [address] [facility], [place] [address]. All rights reserved. This information is not intended as a substitute for professional medical care. Always follow your healthcare professional's instructions.                Follow-ups after your visit        Future tests that were ordered for you today     Open Future Orders        Priority Expected Expires Ordered    CT Abdomen Pelvis w/o Contrast Routine  5/15/2019 5/15/2018            Who to contact     If you have questions or need follow up information about today's clinic visit or your schedule please contact [facility] directly at [phone].  Normal or non-critical lab and imaging results will be communicated to you by [employer], letter or phone within 4 business days after the clinic has received the results. If you do not hear from us within 7 days, please contact the clinic through [employer] or phone. If you have a critical or abnormal lab result, we will notify you by phone as soon as possible.  Submit refill requests through [employer] or call your pharmacy and they will forward the refill request to us. Please allow 3 business days for your refill to be completed.          Additional Information About Your Visit        [email or web address] Information     [employer] gives you secure access to your electronic health record. If you see a primary care provider, you can also send messages to your care team and make appointments. If you have questions, please call your primary care clinic.  If you do not have a primary care provider, please call [phone] and they will assist you.        Care EveryWhere ID     This is your Care EveryWhere ID. This could be used by other organizations to access your [facility]  "records  [id number]        Your Vitals Were     Pulse Temperature Height BMI (Body Mass Index)          57 98.1  F (36.7  C) (Tympanic) 5' 4.75\" (1.645 m) 30.62 kg/m2         Blood Pressure from Last 3 Encounters:   05/15/18 112/66   10/08/17 112/74   05/17/17 113/66    Weight from Last 3 Encounters:   05/15/18 182 lb 9.6 oz (82.8 kg)   10/08/17 173 lb (78.5 kg)   05/17/17 174 lb 4 oz (79 kg)              We Performed the Following     CBC with platelets differential     Comprehensive metabolic panel     UA with Microscopic reflex to Culture          Today's Medication Changes          These changes are accurate as of 5/15/18  4:27 PM.  If you have any questions, ask your nurse or doctor.               Stop taking these medicines if you haven't already. Please contact your care team if you have questions.     predniSONE 20 MG tablet   Commonly known as:  DELTASONE                    Primary Care Provider Office Phone # Fax #    [facility] [phone] [address] [facility] [id number]        Equal Access to Services     EBER CLARK : Hadii adriano Yee, wachelyda curtis, qaybta kaalmada javan, shonda tsai. So [facility] [phone].    ATENCIÓN: Si habla español, tiene a groves disposición servicios gratuitos de asistencia lingüística. Llame al [phone].    We comply with applicable federal civil rights laws and [place] laws. We do not discriminate on the basis of race, color, national origin, age, disability, sex, sexual orientation, or gender identity.            Thank you!     Thank you for choosing [facility]  for your care. Our goal is always to provide you with excellent care. Hearing back from our patients is one way we can continue to improve our services. Please take a few minutes to complete the written survey that you may receive in the mail after your visit with us. Thank you!             Your Updated " Medication List - Protect others around you: Learn how to safely use, store and throw away your medicines at www.disposemymeds.org.          This list is accurate as of 5/15/18  4:27 PM.  Always use your most recent med list.                   Brand Name Dispense Instructions for use Diagnosis    * EPINEPHrine 0.3 MG/0.3ML injection 2-pack    EPIPEN/ADRENACLICK/or ANY BX GENERIC EQUIV    1 each    Inject 0.3 mLs (0.3 mg) into the muscle once as needed for anaphylaxis        * EPINEPHrine 0.3 MG/0.3ML injection 2-pack    EPIPEN/ADRENACLICK/or ANY BX GENERIC EQUIV    0.6 mL    Inject 0.3 mLs (0.3 mg) into the muscle as needed for anaphylaxis (severe allergic reaction)        IRON SUPPLEMENT PO      Take 325 mg by mouth daily.        MAGNESIUM 27 PO      Take 1,250 mcg by mouth        multivitamin, therapeutic Tabs tablet      Take 1 tablet by mouth daily        OMEGA-3 FISH OIL PO      Take 1 g by mouth daily.        VITAMIN D3 PO      Take 1,000 Units by mouth daily        * Notice:  This list has 2 medication(s) that are the same as other medications prescribed for you. Read the directions carefully, and ask your doctor or other care provider to review them with you.

## 2018-05-15 NOTE — PROGRESS NOTES
"  SUBJECTIVE:   Amarilis Espinal is a 32 year old female who presents to clinic today for the following health issues:      Left abdomen and left posterior pelvic pain      Duration: 1-1.5 weeks        Specific cause: none    Description:   Location of pain: low back left radiating into left lower abdominal pain.  New numbness or weakness in legs, not attributed to pain:  no   Patient states appears to be associated with BM - tends to be relieved with BM but feels need to \"push more after\".  Similar sensation after urinating.    Intensity: Currently 4/10    History:   Pain interferes with job: No,   History of back problems: yes  Any previous MRI or X-rays: None  Sees a specialist for back pain:  Chiropractor  Therapies tried without relief: IBU and chiropractor    Alleviating factors:   Improved by: none      Precipitating factors:  Worsened by: Pressure, bowel movements, emptying bladder, staying still,any activity, eating. Noticed gurgling noise when pressing on location of pain in abdomin.  Patient denies constipation, dysuria, diarrhea, incontinence.   Does report feeling of urgency with BM.      Functional and Psychosocial Screen (Bhaskar STarT Back):      Most recent score:    BHASKAR START BACK TOTAL SCORE 5/15/2018   Total Score (all 9) 4             Accompanying Signs & Symptoms:  Risk of Fracture:  None  Risk of Cauda Equina:  None  Risk of Infection:  None  Risk of Cancer:  None  Risk of Ankylosing Spondylitis:  Onset at age <35, male, AND morning back stiffness. no       Hx of abd/pelvic surgery: Esure procedure, D&C.    Problem list and histories reviewed & adjusted, as indicated.  Additional history: as documented    Patient Active Problem List   Diagnosis     Migraine headache     Lumbago     AR (allergic rhinitis)     Lactose intolerance     Past Surgical History:   Procedure Laterality Date     ARTHROSCOPY KNEE RT/LT  1/2005    Left knee surgery     AS HYSTEROS W PERMANENT FALLOPAIN IMPLANT  2014    " Essure     C ORAL SURGERY PROCEDURE  age 18    [place] teeth     DILATION AND CURETTAGE SUCTION  12/30/2010    DILATION AND CURETTAGE SUCTION performed by HENRIQUE RODRIGUEZ at [facility]     TONSILLECTOMY Bilateral 7/25/2016    Procedure: TONSILLECTOMY;  Surgeon: Bradley Medrano MD;  Location:  OR       Social History   Substance Use Topics     Smoking status: Never Smoker     Smokeless tobacco: Never Used     Alcohol use No      Comment: rare-quit with pregnancy     Family History   Problem Relation Age of Onset     Breast Cancer Maternal Grandmother 55     Thyroid Disease Maternal Grandmother      Arthritis Maternal Grandmother      Alzheimer Disease Maternal Grandmother      Gynecology Maternal Grandmother      endometriosis     OSTEOPOROSIS Maternal Grandmother      DIABETES Paternal Grandfather      Depression Paternal Grandfather      Hypertension Mother      Gynecology Mother      endometriosis     HEART DISEASE Maternal Grandfather      MI     CANCER Maternal Grandfather      leukemia     Cardiovascular Maternal Grandfather      Hypertension Maternal Grandfather      Gynecology Paternal Grandmother      endometriosis     Eye Disorder Paternal Grandmother      Obesity Father      Cancer - colorectal No family hx of          Current Outpatient Prescriptions   Medication Sig Dispense Refill     Cholecalciferol (VITAMIN D3 PO) Take 1,000 Units by mouth daily        Ferrous Sulfate (IRON SUPPLEMENT PO) Take 325 mg by mouth daily.       Magnesium Gluconate (MAGNESIUM 27 PO) Take 1,250 mcg by mouth        multivitamin, therapeutic (THERA-VIT) TABS tablet Take 1 tablet by mouth daily       Omega-3 Fatty Acids (OMEGA-3 FISH OIL PO) Take 1 g by mouth daily.       EPINEPHrine (EPIPEN) 0.3 MG/0.3ML injection Inject 0.3 mLs (0.3 mg) into the muscle once as needed for anaphylaxis (Patient not taking: Reported on 5/15/2018) 1 each 1     EPINEPHrine (EPIPEN/ADRENACLICK/OR ANY BX GENERIC EQUIV) 0.3 MG/0.3ML injection 2-pack  "Inject 0.3 mLs (0.3 mg) into the muscle as needed for anaphylaxis (severe allergic reaction) 0.6 mL 1     Allergies   Allergen Reactions     Adhesive Tape Dermatitis     Beef-Derived Products GI Disturbance     Bees Hives     Swelling       Lactose Diarrhea     Morphine Nausea and Vomiting     Soy Allergy GI Disturbance     IBS        Reviewed and updated as needed this visit by clinical staff  Allergies  Meds  Problems       Reviewed and updated as needed this visit by Provider  Allergies  Meds  Problems         ROS:  C: NEGATIVE for fever, chills, or change in weight  I: NEGATIVE for jaundice/rash  E: NEGATIVE for jaundice  R: NEGATIVE for significant cough or SOB  CV: NEGATIVE for chest pain, palpitations or peripheral edema  GI: see above   male :see above  M: NEGATIVE for significant arthralgias or myalgia  H: NEGATIVE for bleeding problems    OBJECTIVE:                                                    /66  Pulse 57  Temp 98.1  F (36.7  C) (Tympanic)  Ht 5' 4.75\" (1.645 m)  Wt 182 lb 9.6 oz (82.8 kg)  BMI 30.62 kg/m2  Body mass index is 30.62 kg/(m^2).  GENERAL:  alert and no distress  NECK: no tenderness, no adenopathy,  Thyroid not enlarged  RESP: lungs clear to auscultation - no rales, no rhonchi, no wheezes  CV: regular rates and rhythm, normal S1 S2, no S3 or S4 and no murmur, no click or rub  ABD: rounded abdomen, no skin changes, mild to moderate LLQ TTP, mild LUQ TTP no mass, no guarding, no Mayen's sign  BACK: no TTP; negative CVA tenderness either side  MS: extremities- no gross deformities noted, no edema  SKIN: no jaundice or rash    Diagnostic test results:  Diagnostic Test Results:  none      ASSESSMENT/PLAN:                                                        ICD-10-CM    1. Abdominal pain, left lower quadrant R10.32 CBC with platelets differential     Comprehensive metabolic panel     CT Abdomen Pelvis w/o Contrast     UA with Microscopic reflex to Culture          No " acute abdomen sign today but with significant lower abdominal pain localized to LLQ  DDx: colitis, diverticulitis, UTI, urolith, adhesions, constipation, musculosckeletal  Work up ordered today based on her symptoms and exam findings.  Increase dietary fiber: vegetables, fruits, whole grains.  Drink 8 glasses of water per day.  Exercise as tolerated.  Acetaminophen 325 mg orally 1-2 tabs every 4-6 hrs as needed for pain  Return precautions discussed and given to patient.        Follow up with Provider - depending on CT result    Patient Instructions   Go to Clinic B now for your blood draw.  You will be contacted in 24 hours for results of your lab tests.    You preferred to defer CT scan of the abdomen/pelvis until after the above are done.    To schedule the CT scan, call [phone].    If pain is severe, you have fever, or other worrisome symptoms, you should be brought to the ER.          *Abdominal Pain, Unknown Cause (Female)    The exact cause of your abdominal (stomach) pain is not certain. This does not mean that this is something to worry about, or the right tests were not done. Everyone likes to know the exact cause of the problem, but sometimes with abdominal pain, there is no clear-cut cause, and this could be a good thing. The good news is that your symptoms can be treated, and you will feel better.   Your condition does not seem serious now; however, sometimes the signs of a serious problem may take more time to appear. For this reason, it is important for you to watch for any new symptoms, problems, or worsening of your condition.  Over the next few days, the abdominal pain may come and go, or be continuous. Other common symptoms can include nausea and vomiting. Sometimes it can be difficult to tell if you feel nauseous, you may just feel bad and not associate that feeling with nausea. Constipation, diarrhea, and a fever may go along with the pain.  The pain may continue even if treated correctly  over the following days. Depending on how things go, sometimes the cause can become clear and may require further or different treatment. Additional evaluations, medications, or tests may be needed.  Home care  Your health care provider may prescribe medications for pain, symptoms, or an infection.  Follow the health care provider's instructions for taking these medications.  General care    Rest until your next exam. No strenuous activities.    Try to find positions that ease discomfort. A small pillow placed on the abdomen may help relieve pain.    Something warm on your abdomen (such as a heating pad) may help, but be careful not to burn yourself.  Diet    Do not force yourself to eat, especially if having cramps, vomiting, or diarrhea.    Water is important so you do not get dehydrated. Soup may also be good. Sports drinks may also help, especially if they are not too acidic. Make sure you don't drink sugary drinks as this can make things worse. Take liquids in small amounts. Do not guzzle them.    Caffeine sometimes makes the pain and cramping worse.    Avoid dairy products if you have vomiting or diarrhea.    Don't eat large amounts at a time. Wait a few minutes between bites.    Eat a diet low in fiber (called a low-residue diet). Foods allowed include refined breads, white rice, fruit and vegetable juices without pulp, tender meats. These foods will pass more easily through the intestine.    Avoid fried or fatty foods, dairy, alcohol and spicy foods until your symptoms go away.  Follow-up care  Follow up with your health care provider as instructed, or if your pain does not begin to improve in the next 24 hours.  When to seek medical care  Seek prompt medical care if any of the following occur:    Pain gets worse or moves to the right lower abdomen    New or worsening vomiting or diarrhea    Swelling of the abdomen    Unable to pass stool for more than three days    New fever over 101  F (38.3 C), or rising  fever    Blood in vomit or bowel movements (dark red or black color)    Jaundice (yellow color of eyes and skin)    Weakness, dizziness    Chest, arm, back, neck or jaw pain    Unexpected vaginal bleeding or missed period  Call 911  Call emergency services if any of the following occur:    Trouble breathing    Confusion    Fainting or loss of consciousness    Rapid heart rate    Seizure    [id number] [facility], [address] [place] [address]. All rights reserved. This information is not intended as a substitute for professional medical care. Always follow your healthcare professional's instructions.            Isaiah Hardy MD  [facility]

## 2018-06-05 ENCOUNTER — TELEPHONE (OUTPATIENT)
Dept: FAMILY MEDICINE | Facility: CLINIC | Age: 33
End: 2018-06-05

## 2018-06-05 DIAGNOSIS — R10.32 ABDOMINAL PAIN, LEFT LOWER QUADRANT: Primary | ICD-10-CM

## 2018-06-05 NOTE — TELEPHONE ENCOUNTER
Reason for Call: Request for an order or referral:    Order or referral being requested: Referral    Date needed: as soon as possible    Has the patient been seen by the PCP for this problem? YES    Additional comments: Pt is requesting a referral to OB/GYN Dr Tate for Left side pain (Ovaries?).    Also, Dr Hardy ordered a CT and patient is wondering if she should go through with it since she is in so much pain. She usually works through it with exercise but it isn't helping. Please advise.    Phone number Patient can be reached at:  Home number on file [phone] (home)    Best Time:  any    Can we leave a detailed message on this number?      Call taken on 6/5/2018 at 9:06 AM by Renetta Carpenter

## 2018-06-06 DIAGNOSIS — R10.32 ABDOMINAL PAIN, LEFT LOWER QUADRANT: ICD-10-CM

## 2018-06-06 LAB
BASOPHILS # BLD AUTO: 0.2 10E9/L (ref 0–0.2)
BASOPHILS NFR BLD AUTO: 1.4 %
BETA HCG QUAL IFA URINE: NEGATIVE
DIFFERENTIAL METHOD BLD: ABNORMAL
EOSINOPHIL # BLD AUTO: 0.2 10E9/L (ref 0–0.7)
EOSINOPHIL NFR BLD AUTO: 1.8 %
ERYTHROCYTE [DISTWIDTH] IN BLOOD BY AUTOMATED COUNT: 11.8 % (ref 10–15)
HCT VFR BLD AUTO: 40.3 % (ref 35–47)
HGB BLD-MCNC: 13.9 G/DL (ref 11.7–15.7)
LYMPHOCYTES # BLD AUTO: 2.8 10E9/L (ref 0.8–5.3)
LYMPHOCYTES NFR BLD AUTO: 25.5 %
MCH RBC QN AUTO: 30.8 PG (ref 26.5–33)
MCHC RBC AUTO-ENTMCNC: 34.5 G/DL (ref 31.5–36.5)
MCV RBC AUTO: 89 FL (ref 78–100)
MONOCYTES # BLD AUTO: 0.7 10E9/L (ref 0–1.3)
MONOCYTES NFR BLD AUTO: 6 %
NEUTROPHILS # BLD AUTO: 7.2 10E9/L (ref 1.6–8.3)
NEUTROPHILS NFR BLD AUTO: 65.3 %
PLATELET # BLD AUTO: 298 10E9/L (ref 150–450)
RBC # BLD AUTO: 4.51 10E12/L (ref 3.8–5.2)
WBC # BLD AUTO: 11.1 10E9/L (ref 4–11)

## 2018-06-06 PROCEDURE — 84703 CHORIONIC GONADOTROPIN ASSAY: CPT | Performed by: FAMILY MEDICINE

## 2018-06-06 PROCEDURE — 85025 COMPLETE CBC W/AUTO DIFF WBC: CPT | Performed by: FAMILY MEDICINE

## 2018-06-06 PROCEDURE — 36415 COLL VENOUS BLD VENIPUNCTURE: CPT | Performed by: FAMILY MEDICINE

## 2018-06-06 NOTE — TELEPHONE ENCOUNTER
Ectopic pregnancies can be a complication of Essure procedures. There are documented cases of this, so it is recommended to rule out pregnancy with lower abd pain and pasing out fleshy material during period even if patient has had the procedure done. If patient declines still, please document in chart that she did.

## 2018-06-06 NOTE — TELEPHONE ENCOUNTER
"With the report of passing of \"pink tissue with last period\", recommend the following plan:    1) urinalysis (apparently the urinalysis was not carried out at the visit) - already in order  2) cancel CT scan for now  3) urine pregnancy test - ordered  4) pelvic ultrasound ordered - please assist patient in scheduling this at soonest possible time.  5) repeat CBC - ordered  6) schedule patient for clinic follow up if can't get into OB clinic soon - if all of the above can be accomplished today, she may be scheduled for clinic visit later today after ultrasound.      "

## 2018-06-06 NOTE — TELEPHONE ENCOUNTER
Patient notified of Dr. Hardy's message. She states she will complete the urine pregnancy test tomorrow.     Joan DUBON RN

## 2018-06-06 NOTE — TELEPHONE ENCOUNTER
Esure procedure so she can't pregnant - she doesn't want the pregnancy test run. Are you ok with this?  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     Yanet Ackerman, BSN, RN

## 2018-06-07 ENCOUNTER — TELEPHONE (OUTPATIENT)
Dept: OBGYN | Facility: CLINIC | Age: 33
End: 2018-06-07

## 2018-06-07 NOTE — TELEPHONE ENCOUNTER
"Reason for call:  Patient reporting a symptom    Symptom or request: Was seen by Dr. Hardy as unable to get into see Dr. Tate until July- to get things figured out.  Awaiting labs - significant low pain - passing more and more tissue she states \"feels like I am giving birth during my period\"  States she did schedule upcoming appt 6-19-18 with CNM.    Duration (how long have symptoms been present): periods getting worse and worse for awhile - pain for over a month.    Have you been treated for this before? Yes    Additional comments:     Phone Number patient can be reached at:  Home number on file [phone] (home)    Best Time:  any    Can we leave a detailed message on this number:  YES    Call taken on 6/7/2018 at 3:14 PM by Margaret Lomax    "

## 2018-06-07 NOTE — TELEPHONE ENCOUNTER
Spoke with patient on the phone.  US scheduled for tomorrow.    Patient reports continued pain.  Unsure of what is going on.  Patient has been seen in FP  Referred to OB/GYN    Patient scheduled for appointment on 6/13/18 at 1115 in [facility] with Dr. Forde.    Patient advised if pain is persistent and worsening, patient to ER for further evaluation if unable to wait for clinic appointment.  Pt in agreement and reports understanding.      Emelia Patton   [facility] Clinic  RN

## 2018-06-08 ENCOUNTER — HOSPITAL ENCOUNTER (OUTPATIENT)
Dept: ULTRASOUND IMAGING | Facility: CLINIC | Age: 33
Discharge: HOME | End: 2018-06-08
Attending: FAMILY MEDICINE | Admitting: FAMILY MEDICINE
Payer: OTHER GOVERNMENT

## 2018-06-08 DIAGNOSIS — R10.32 ABDOMINAL PAIN, LEFT LOWER QUADRANT: ICD-10-CM

## 2018-06-08 PROCEDURE — 76830 TRANSVAGINAL US NON-OB: CPT

## 2018-06-11 ENCOUNTER — TELEPHONE (OUTPATIENT)
Dept: FAMILY MEDICINE | Facility: CLINIC | Age: 33
End: 2018-06-11

## 2018-06-11 NOTE — TELEPHONE ENCOUNTER
S-(situation): Abdominal pain    B-(background): Seen by Dr. Hardy 05/15/18  1. Abdominal pain, left lower quadrant R10.32 CBC with platelets differential       Comprehensive metabolic panel       CT Abdomen Pelvis w/o Contrast       UA with Microscopic reflex to Culture     A-(assessment): Patient state she is still having abdominal pain. She states it is a stabbing pain and is worsening. Back pain still persistent. Nausea. Soft stools- last bowel movement today. Last menses 05/25/18-05/28/18- states it was heavy.    Denies: vomiting, bloody stools, faint, weakness, sweating    R-(recommendations): Advised to go to ED per Telephone Triage Protocols for Nurses 5th Edition. Patient states she would go but is not able to afford the visit. Has appointment on Wednesday with OBGYN She asks for further recommendation from Dr. Hardy.      Joan DUBON RN

## 2018-06-11 NOTE — TELEPHONE ENCOUNTER
Her ultrasound showed only right ovarian cyst.    Agree with ER visit if with significant pain that is worsening.  Patient will need CT scan likely if that is the case.

## 2018-06-11 NOTE — TELEPHONE ENCOUNTER
Reason for Call:  Abdominal pain    Detailed comments: patient is calling and is in tears. She had her Ultrasound last Friday and has heard nothing about results, she was told her appt. Down in [place] today, and when she checked in it is not until Wed. She is in a tremendous amount of abdominal pain. Can we get Dr. Hardy to read the Ultrasound, and advise her about her pain?    Phone Number Patient can be reached at: Cell number on file:    Telephone Information:   Mobile [phone]       Best Time: any    Can we leave a detailed message on this number? YES   Alma Rosa Ferguson  Clinic Station [place] [facility]      Call taken on 6/11/2018 at 11:00 AM by Alma Rosa Ferguson

## 2018-06-11 NOTE — TELEPHONE ENCOUNTER
"Patient was notified by phone. She states \"I cannot afford an emergency visit, I can't do it. I am going to try and wait until my OBGYN appointment on Wednesday.\" I discussed ED precautions. Patient became tearful during call and stated \"Okay but I cannot afford and will wait until my appointment Wednesday\".     Joan DUBON RN    "

## 2018-06-13 ENCOUNTER — TELEPHONE (OUTPATIENT)
Dept: FAMILY MEDICINE | Facility: CLINIC | Age: 33
End: 2018-06-13

## 2018-06-13 ENCOUNTER — OFFICE VISIT (OUTPATIENT)
Dept: OBGYN | Facility: CLINIC | Age: 33
End: 2018-06-13
Payer: OTHER GOVERNMENT

## 2018-06-13 VITALS
BODY MASS INDEX: 29.18 KG/M2 | WEIGHT: 174 LBS | DIASTOLIC BLOOD PRESSURE: 65 MMHG | HEART RATE: 53 BPM | SYSTOLIC BLOOD PRESSURE: 102 MMHG | OXYGEN SATURATION: 99 %

## 2018-06-13 DIAGNOSIS — N92.0 EXCESSIVE OR FREQUENT MENSTRUATION: Primary | ICD-10-CM

## 2018-06-13 DIAGNOSIS — R10.32 ABDOMINAL PAIN, LEFT LOWER QUADRANT: Primary | ICD-10-CM

## 2018-06-13 PROCEDURE — 99214 OFFICE O/P EST MOD 30 MIN: CPT | Performed by: OBSTETRICS & GYNECOLOGY

## 2018-06-13 RX ORDER — NORETHINDRONE ACETATE 5 MG
5 TABLET ORAL DAILY
Qty: 30 TABLET | Refills: 12 | Status: SHIPPED | OUTPATIENT
Start: 2018-06-13 | End: 2018-07-09

## 2018-06-13 NOTE — PROGRESS NOTES
Amarilis is a 32 year old   is here today complaining of pain.  This has been a problem for greater than a month. It is left lower quadrant and left lower back. It is achy and comes in waves at time.  She has had nausea.  She has noted a change in her BMs and they are much more soft and not formed.  She reports LLQ pain that occurs while she eats.  Her cycles are regular lasting 4-5 days and heavy for the entire time.   She reports cramping.  She reports more clots.  She isn't sexually active and no history of dyspreunia    No urinary frequency or dysuria, bladder or kidney problems    ROS: Ten point review of systems was reviewed and negative except the above.    Gyn Hx:      Past Medical History:   Diagnosis Date     Arthritis     hands, knees     Chickenpox      Esophageal reflux      Headache 2010     Intestinal disaccharidase deficiencies and disaccharide malabsorption     Lactose Intolerant     Migraines      Other specified anemias     Hx of Anemia     PONV (postoperative nausea and vomiting)      Past Surgical History:   Procedure Laterality Date     ARTHROSCOPY KNEE RT/LT  2005    Left knee surgery     AS HYSTEROS W PERMANENT FALLOPAIN IMPLANT      [place]     C ORAL SURGERY PROCEDURE  age 18    [place] teeth     DILATION AND CURETTAGE SUCTION  2010    DILATION AND CURETTAGE SUCTION performed by HENRIQUE RODRIGUEZ at [facility]     GYN SURGERY      [place]     TONSILLECTOMY Bilateral 2016    Procedure: TONSILLECTOMY;  Surgeon: Bradley Medrano MD;  Location:  OR     Patient Active Problem List   Diagnosis     Migraine headache     Lumbago     AR (allergic rhinitis)     Lactose intolerance       ALL/Meds: Her medication and allergy histories were reviewed and are documented in their appropriate chart areas.    SH: Reviewed and documented in the appropriate area of the chart.  FH:  Her family history is reviewed and updated in the chart, today.  PMH: Her past medical, surgical, and  obstetric histories were reviewed and updated today in the appropriate chart areas.    PE: /65  Pulse 53  Wt 174 lb (78.9 kg)  LMP 05/25/2018  SpO2 99%  Breastfeeding? No  BMI 29.18 kg/m2  Body mass index is 29.18 kg/(m^2).    General Appearance:  healthy, alert, active, no distress  Cardiovascular:  Regular rate and Rhythm  Neck: Supple, no adenopathy and thyroid normal  Lungs:  Clear, without wheeze, rale or rhonchi  Breast: deferred  Abdomen: Benign, No masses, organomegaly, No inguinal nodes, Bowel sounds normoactive.  Tender in the LLQ, no rebound.  No tenderness in the suprapubic or RLQ.   Pelvic:       - Ext: Vulva and perineum are normal without lesion, mass or discharge        - Urethra: normal without discharge or scarring no hypermobility       - Urethral Meatus: normal appearance,        - Bladder: no tenderness, no masses       - Vagina:  without discharge and rugated       - Cervix: normal       - Uterus:Normal shape, position and consistency and Nontender       - Adnexa: Normal without masses or tenderness on the right.  Left shows no masses, mild - to moderate tenderness       - Rectal: deferred    U/S: Endometrium: Endometrium is 8 mm thick.      Uterus: 8.7 x 4.2 x 4.8 cm. No focal myometrial lesion.     Right ovary: 2.1 cm cyst that appears simple.     Left ovary: Normal.     Additional findings: None.         IMPRESSION:    1. Right ovarian simple cyst.  2. No acute abnormality    Discussed with Amarilis, the options for treatment of her dysmenorrhea.  We discussed medical management including both oral and non-oral options.  We discussed the risks and benefits of cyclic and continuous estrogen/progestin combinations including thrombotic events.  We discussed cyclic and and continuous progestins including breakthough bleeding.    We discussed surgical options including, but not limited to endometrial ablation and hysterectomy.  I explained the risks of both procedures including  bleeding, infection, and injury to other structures such as the bladder, ureters and bowel.    Explained that the left sided abdominal pain and left lower back pain do not seem to be gynecologic.  The change in her BM and the LLQ nature suggest this might be GASTROINTESTINAL, more specifically colon.    A/P:(N92.0) Excessive or frequent menstruation  (primary encounter diagnosis)  Comment: Out of 30 minutes spent face to face with the patient, > 50% of this was spent in consultation.    Plan: norethindrone (AYGESTIN) 5 MG tablet                 -     - No orders of the defined types were placed in this encounter.

## 2018-06-13 NOTE — TELEPHONE ENCOUNTER
Reason for Call:  Ultrasound Results    Detailed comments: patient is calling for Ultrasound results, and states she is still having LLQ pain, and wondering what the next step is for her, to resolve this. Please advise.    Phone Number Patient can be reached at: Cell number on file:    Telephone Information:   Mobile [phone]       Best Time: any    Can we leave a detailed message on this number? YES     Alma Rosa Ferguson  Clinic Station  [facility]      Call taken on 6/13/2018 at 3:16 PM by Alma Rosa Ferguson

## 2018-06-13 NOTE — PATIENT INSTRUCTIONS
If you have any questions regarding your visit, Please contact your care team.    Women s Health CLINIC HOURS TELEPHONE NUMBER   MD Alvina Luis CMA Lisa -    LINDA Stapleton       Monday:       7:30-4:30 [place]  Wednesday:       7:30-4:30 [place]  Thursday:       7:30-1:30 [place]  Friday:       7:30-11:30 [facility]  [address] [place] [place]  [address]  [phone] ask for [facility]  [address] [place] [address]  [phone] ask for [facility]    Imaging Scheduling for [place]:  [phone]    Imaging Scheduling for [place]: [phone]       Urgent Care locations:    [facility] Saturday and Sunday   9 am - 5 pm    Monday-Friday   12 pm - 8 pm  Saturday and Sunday   9 am - 5 pm   [phone]     [facility] Labor and Delivery:  [phone]    If you need a medication refill, please contact your pharmacy. Please allow 3 business days for your refill to be completed.  As always, Thank you for trusting us with your healthcare needs!

## 2018-06-13 NOTE — TELEPHONE ENCOUNTER
"S; see note below.   B: I called her,  I don't know why it says calling for ultrasound results. She says she discussed that with the OB GYN today already.   \"I followed Dr Hardy's orders and  went to the OB today , he went through everything, including the ultrasound,  and he thinks it is potentially something in my colon.  \"  \"he recommended I see GI or general surgery, but he wanted Dr Hardy to see his notes and make the recommendations and referral. \"  Reports she has had this pain since at least April.   \"Abd pain is constant, I think it is tied to my back, I am seeing a chiropractor for that. \"  \"Even pressure from my clothing waistband hurts. \"  \"I have extremely soft BM's, they are not forming, I would not call it diarrhea. \"  Has a BM once a day, at 5 pm. That is consistent with her \"norm\"   Pain is below navel to the left, just above the hip and into the  back ,\" muscles in back are tight. \"     CT scan was ordered 5/15/18  and she didn't do it, \"Dr Forde (OB GYN) doesn't think it is a stone, I don't know what else we would be looking for with the CT? \"   Dr Hardy, Do you want her to do that CT now? Or what next?   Eloisa Beyer RNC            "

## 2018-06-13 NOTE — MR AVS SNAPSHOT
After Visit Summary   6/13/2018    Amarilis Espinal    MRN: [id number]           Patient Information     Date Of Birth          1985        Visit Information        Provider Department      6/13/2018 11:15 AM Jarrett Forde MD [facility]        Today's Diagnoses     Excessive or frequent menstruation    -  1      Care Instructions                                                         If you have any questions regarding your visit, Please contact your care team.    Women s Health CLINIC HOURS TELEPHONE NUMBER   MD Alvina Luis CMA Lisa -    LINDA [place]apleton       Monday:       7:30-4:30 [place]  Wednesday:       7:30-4:30 [place]  Thursday:       7:30-1:30 [place]  Friday:       7:30-11:30 [facility]  [address] [place]  [address]  [phone] ask for [facility]  [address] [place] [address]  [phone] ask for [facility]    Imaging Scheduling for [place]:  [phone]    Imaging Scheduling for [place]: [phone]       Urgent Care locations:    [facility] Saturday and Sunday   9 am - 5 pm    Monday-Friday   12 pm - 8 pm  Saturday and Sunday   9 am - 5 pm   [phone]     [facility] Labor and Delivery:  [phone]    If you need a medication refill, please contact your pharmacy. Please allow 3 business days for your refill to be completed.  As always, Thank you for trusting us with your healthcare needs!              Follow-ups after your visit        Who to contact     If you have questions or need follow up information about today's clinic visit or your schedule please contact [facility] directly at [phone].  Normal or non-critical lab and imaging results will be communicated to you by MyChart, letter or phone within 4 business days after the clinic has received the  results. If you do not hear from us within 7 days, please contact the clinic through [employer] or phone. If you have a critical or abnormal lab result, we will notify you by phone as soon as possible.  Submit refill requests through [employer] or call your pharmacy and they will forward the refill request to us. Please allow 3 business days for your refill to be completed.          Additional Information About Your Visit        [employer] Information     [employer] gives you secure access to your electronic health record. If you see a primary care provider, you can also send messages to your care team and make appointments. If you have questions, please call your primary care clinic.  If you do not have a primary care provider, please call [phone] and they will assist you.        Care EveryWhere ID     This is your Care EveryWhere ID. This could be used by other organizations to access your [place] medical records  [id number]        Your Vitals Were     Pulse Last Period Pulse Oximetry Breastfeeding? BMI (Body Mass Index)       53 05/25/2018 99% No 29.18 kg/m2        Blood Pressure from Last 3 Encounters:   06/13/18 102/65   05/15/18 112/66   10/08/17 112/74    Weight from Last 3 Encounters:   06/13/18 174 lb (78.9 kg)   05/15/18 182 lb 9.6 oz (82.8 kg)   10/08/17 173 lb (78.5 kg)              Today, you had the following     No orders found for display         Today's Medication Changes          These changes are accurate as of 6/13/18  1:04 PM.  If you have any questions, ask your nurse or doctor.               Start taking these medicines.        Dose/Directions    norethindrone 5 MG tablet   Commonly known as:  AYGESTIN   Used for:  Excessive or frequent menstruation   Started by:  Jarrett Forde MD        Dose:  5 mg   Take 1 tablet (5 mg) by mouth daily   Quantity:  30 tablet   Refills:  12            Where to get your medicines      These medications were sent to [facility] [place] - OFELIA Matthews -  [address]  [address] [address]     Phone:  [phone]     norethindrone 5 MG tablet                Primary Care Provider Office Phone # Fax #    [facility] [phone]       [address] [address]        Equal Access to Services     EBER CLARK : Hadii aad ku hadasho Soomaali, waaxda luqadaha, qaybta kaalmada adeegyada, waxay idiin hayaan adeeg kharash la'aan . So [facility] [phone].    ATENCIÓN: Si habla español, tiene a groves disposición servicios gratuitos de asistencia lingüística. Casey al [phone].    We comply with applicable federal civil rights laws and [place] laws. We do not discriminate on the basis of race, color, national origin, age, disability, sex, sexual orientation, or gender identity.            Thank you!     Thank you for choosing [facility]  for your care. Our goal is always to provide you with excellent care. Hearing back from our patients is one way we can continue to improve our services. Please take a few minutes to complete the written survey that you may receive in the mail after your visit with us. Thank you!             Your Updated Medication List - Protect others around you: Learn how to safely use, store and throw away your medicines at www.disposemymeds.org.          This list is accurate as of 6/13/18  1:04 PM.  Always use your most recent med list.                   Brand Name Dispense Instructions for use Diagnosis    * EPINEPHrine 0.3 MG/0.3ML injection 2-pack    EPIPEN/ADRENACLICK/or ANY BX GENERIC EQUIV    1 each    Inject 0.3 mLs (0.3 mg) into the muscle once as needed for anaphylaxis        * EPINEPHrine 0.3 MG/0.3ML injection 2-pack    EPIPEN/ADRENACLICK/or ANY BX GENERIC EQUIV    0.6 mL    Inject 0.3 mLs (0.3 mg) into the muscle as needed for anaphylaxis (severe allergic reaction)        IRON SUPPLEMENT PO      Take 325 mg by mouth daily.        MAGNESIUM 27 PO      Take 1,250  mcg by mouth        multivitamin, therapeutic Tabs tablet      Take 1 tablet by mouth daily        norethindrone 5 MG tablet    AYGESTIN    30 tablet    Take 1 tablet (5 mg) by mouth daily    Excessive or frequent menstruation       OMEGA-3 FISH OIL PO      Take 1 g by mouth daily.        VITAMIN D3 PO      Take 1,000 Units by mouth daily        * Notice:  This list has 2 medication(s) that are the same as other medications prescribed for you. Read the directions carefully, and ask your doctor or other care provider to review them with you.

## 2018-06-14 NOTE — TELEPHONE ENCOUNTER
After GYN evaluation and findings, agree with Dr. Forde to consult gastroenterology for the persistent lower abdominal pain. Referral can be placed for patient.  May defer CT scan until she sees GI.

## 2018-06-14 NOTE — TELEPHONE ENCOUNTER
Pt notified.  She asks that I send a [employer] message to her with the gastroenterology clinic phone number options.  Thank you.  Cherelle Albert RN

## 2018-07-09 ENCOUNTER — OFFICE VISIT (OUTPATIENT)
Dept: OBGYN | Facility: CLINIC | Age: 33
End: 2018-07-09
Payer: OTHER GOVERNMENT

## 2018-07-09 ENCOUNTER — TELEPHONE (OUTPATIENT)
Dept: OBGYN | Facility: CLINIC | Age: 33
End: 2018-07-09

## 2018-07-09 VITALS
WEIGHT: 176.6 LBS | RESPIRATION RATE: 16 BRPM | BODY MASS INDEX: 29.42 KG/M2 | DIASTOLIC BLOOD PRESSURE: 54 MMHG | HEIGHT: 65 IN | HEART RATE: 57 BPM | SYSTOLIC BLOOD PRESSURE: 113 MMHG | TEMPERATURE: 97.2 F

## 2018-07-09 DIAGNOSIS — N92.0 EXCESSIVE OR FREQUENT MENSTRUATION: Primary | ICD-10-CM

## 2018-07-09 PROCEDURE — 99214 OFFICE O/P EST MOD 30 MIN: CPT | Performed by: OBSTETRICS & GYNECOLOGY

## 2018-07-09 NOTE — MR AVS SNAPSHOT
After Visit Summary   7/9/2018    Amarilis Espinal    MRN: [id number]           Patient Information     Date Of Birth          1985        Visit Information        Provider Department      7/9/2018 10:00 AM Lucy Tate MD [facility]        Today's Diagnoses     Excessive or frequent menstruation    -  1       Follow-ups after your visit        Your next 10 appointments already scheduled     Jul 11, 2018  7:00 AM CDT   Pre-Op physical with Hoang Andrade MD   [facility]    [address] [address]   [phone]              Who to contact     If you have questions or need follow up information about today's clinic visit or your schedule please contact [facility] directly at [phone].  Normal or non-critical lab and imaging results will be communicated to you by MyChart, letter or phone within 4 business days after the clinic has received the results. If you do not hear from us within 7 days, please contact the clinic through MyChart or phone. If you have a critical or abnormal lab result, we will notify you by phone as soon as possible.  Submit refill requests through [employer] or call your pharmacy and they will forward the refill request to us. Please allow 3 business days for your refill to be completed.          Additional Information About Your Visit        MyChart Information     [employer] gives you secure access to your electronic health record. If you see a primary care provider, you can also send messages to your care team and make appointments. If you have questions, please call your primary care clinic.  If you do not have a primary care provider, please call [phone] and they will assist you.        Care EveryWhere ID     This is your Care EveryWhere ID. This could be used by other organizations to access your [place] medical records  [id number]        Your Vitals Were   "   Pulse Temperature Respirations Height Last Period Breastfeeding?    57 97.2  F (36.2  C) (Tympanic) 16 5' 5\" (1.651 m) 07/04/2018 No    BMI (Body Mass Index)                   29.39 kg/m2            Blood Pressure from Last 3 Encounters:   07/09/18 113/54   06/13/18 102/65   05/15/18 112/66    Weight from Last 3 Encounters:   07/09/18 176 lb 9.6 oz (80.1 kg)   06/13/18 174 lb (78.9 kg)   05/15/18 182 lb 9.6 oz (82.8 kg)              We Performed the Following     Sondra-Operative Worksheet          Today's Medication Changes          These changes are accurate as of 7/9/18 10:30 AM.  If you have any questions, ask your nurse or doctor.               Stop taking these medicines if you haven't already. Please contact your care team if you have questions.     norethindrone 5 MG tablet   Commonly known as:  AYGESTIN   Stopped by:  Lucy Tate MD                    Primary Care Provider Office Phone # Fax #    [facility] [phone] [address] [address]        Equal Access to Services     EBER CLARK AH: Hadii adriano patinoo Sogabrielleali, waaxda luqadaha, qaybta kaalmada adeegyada, shonda tsai. So [facility] [phone].    ATENCIÓN: Si habla español, tiene a groves disposición servicios gratuitos de asistencia lingüística. Marcame al [phone].    We comply with applicable federal civil rights laws and [place] laws. We do not discriminate on the basis of race, color, national origin, age, disability, sex, sexual orientation, or gender identity.            Thank you!     Thank you for choosing [facility]  for your care. Our goal is always to provide you with excellent care. Hearing back from our patients is one way we can continue to improve our services. Please take a few minutes to complete the written survey that you may receive in the mail after your visit with us. Thank you!             Your Updated Medication List - " Protect others around you: Learn how to safely use, store and throw away your medicines at www.disposemymeds.org.          This list is accurate as of 7/9/18 10:30 AM.  Always use your most recent med list.                   Brand Name Dispense Instructions for use Diagnosis    * EPINEPHrine 0.3 MG/0.3ML injection 2-pack    EPIPEN/ADRENACLICK/or ANY BX GENERIC EQUIV    1 each    Inject 0.3 mLs (0.3 mg) into the muscle once as needed for anaphylaxis        * EPINEPHrine 0.3 MG/0.3ML injection 2-pack    EPIPEN/ADRENACLICK/or ANY BX GENERIC EQUIV    0.6 mL    Inject 0.3 mLs (0.3 mg) into the muscle as needed for anaphylaxis (severe allergic reaction)        IRON SUPPLEMENT PO      Take 325 mg by mouth daily.        MAGNESIUM 27 PO      Take 1,250 mcg by mouth        multivitamin, therapeutic Tabs tablet      Take 1 tablet by mouth daily        OMEGA-3 FISH OIL PO      Take 1 g by mouth daily.        VITAMIN D3 PO      Take 1,000 Units by mouth daily        * Notice:  This list has 2 medication(s) that are the same as other medications prescribed for you. Read the directions carefully, and ask your doctor or other care provider to review them with you.

## 2018-07-09 NOTE — TELEPHONE ENCOUNTER
"  You are now scheduled for surgery at [facility].  Below are the details for your surgery.  Please read the \"Preparing for Your Surgery\" instructions and let us know if you have any questions.    Type of surgery: dilation, curettage, hysteroscopy, and endometrial ablation  Surgeon:  Lucy Tate MD  Location of surgery: [facility] OR    Date of surgery: 7-18-18    Time: 10:20am   Arrival Time: 9:20am    Time can change, to be confirmed a couple of days prior by pre-op surgery nurse.    Pre-Op Appt Date: Patient to schedule with a PCP or Family Practice Provider within 30 days to the surgery.  Post-Op Appt Date: To be determined by provider     Packet sent out: Yes  Pre-cert/Authorization completed:  TBD by Financial Securing Office.   MA Sterilization/Hysterectomy Acknowledgment Consent signed: Not Applicable    [facility]  [phone]    Fax: [phone]  Same Day Surgery [phone]  Fax: [phone]    "

## 2018-07-09 NOTE — NURSING NOTE
"Initial /54 (BP Location: Right arm, Patient Position: Chair, Cuff Size: Adult Regular)  Pulse 57  Temp 97.2  F (36.2  C) (Tympanic)  Resp 16  Ht 5' 5\" (1.651 m)  Wt 176 lb 9.6 oz (80.1 kg)  LMP 07/04/2018  Breastfeeding? No  BMI 29.39 kg/m2 Estimated body mass index is 29.39 kg/(m^2) as calculated from the following:    Height as of this encounter: 5' 5\" (1.651 m).    Weight as of this encounter: 176 lb 9.6 oz (80.1 kg). .    Karen Michele, ELAYNE    "

## 2018-07-09 NOTE — PROGRESS NOTES
"Amarilis is a 33 year old  here for follow up of heavy menses.  Tried progesterone without success.  Bleeding came back heavier, and patient did not like the side effects.  Seen previously by Dr. Forde.  She desires avoidance of pills at this point.      ROS: Ten point review of systems was reviewed and negative except the above.    PMH: Her past medical, surgical, and obstetric histories were reviewed and are documented in their appropriate chart areas.    ALL/Meds: Her medication and allergy histories were reviewed and are documented in their appropriate chart areas.    Social History   Substance Use Topics     Smoking status: Never Smoker     Smokeless tobacco: Never Used     Alcohol use Yes      Comment: rare        PE: /54 (BP Location: Right arm, Patient Position: Chair, Cuff Size: Adult Regular)  Pulse 57  Temp 97.2  F (36.2  C) (Tympanic)  Resp 16  Ht 5' 5\" (1.651 m)  Wt 176 lb 9.6 oz (80.1 kg)  LMP 2018  Breastfeeding? No  BMI 29.39 kg/m2    Pelvic U/S (18):  FINDINGS:   Endometrium: Endometrium is 8 mm thick.      Uterus: 8.7 x 4.2 x 4.8 cm. No focal myometrial lesion.     Right ovary: 2.1 cm cyst that appears simple.     Left ovary: Normal.     Additional findings: None.         IMPRESSION:    1. Right ovarian simple cyst.  2. No acute abnormality.      A/P 33 year old  here for     ICD-10-CM    1. Excessive or frequent menstruation N92.0 Sondra-Operative Worksheet        1. Reviewed all options with patient including hormonal therapy, Mirena, ablation, Lysteda, and hysterectomy.  Patient has tried progesterone and did not do well.  She would like to proceed with ablation.  Patient understood risks and benefits including risks of bleeding, infection and damage to surrounding structures.  The patient consented to proceed.       Lucy Tate M.D.      25 minutes was spent face to face with the patient today discussing her history, diagnosis, and follow-up plan as " noted above.  Over 50% of the visit was spent in counseling and coordination of care.

## 2018-07-11 ENCOUNTER — ANESTHESIA EVENT (OUTPATIENT)
Dept: SURGERY | Facility: CLINIC | Age: 33
End: 2018-07-11
Payer: OTHER GOVERNMENT

## 2018-07-11 ENCOUNTER — OFFICE VISIT (OUTPATIENT)
Dept: FAMILY MEDICINE | Facility: CLINIC | Age: 33
End: 2018-07-11
Payer: OTHER GOVERNMENT

## 2018-07-11 VITALS
OXYGEN SATURATION: 97 % | TEMPERATURE: 97.8 F | BODY MASS INDEX: 28.82 KG/M2 | HEART RATE: 56 BPM | DIASTOLIC BLOOD PRESSURE: 58 MMHG | HEIGHT: 65 IN | SYSTOLIC BLOOD PRESSURE: 86 MMHG | WEIGHT: 173 LBS

## 2018-07-11 DIAGNOSIS — I95.9 HYPOTENSION, UNSPECIFIED HYPOTENSION TYPE: ICD-10-CM

## 2018-07-11 DIAGNOSIS — Z01.818 PREOP GENERAL PHYSICAL EXAM: Primary | ICD-10-CM

## 2018-07-11 DIAGNOSIS — N93.9 ABNORMAL UTERINE BLEEDING: ICD-10-CM

## 2018-07-11 PROCEDURE — 99214 OFFICE O/P EST MOD 30 MIN: CPT | Performed by: FAMILY MEDICINE

## 2018-07-11 NOTE — PROGRESS NOTES
[facility]  [address] [place] [address]  [phone]  Dept: [phone]    PRE-OP EVALUATION:  Today's date: 2018    Amarilis Espinal (: 1985) presents for pre-operative evaluation assessment as requested by Dr. Tate .  She requires evaluation and anesthesia risk assessment prior to undergoing surgery/procedure for treatment of endometrial ablation.  .    Proposed Surgery/ Procedure: COMBINED DILATION AND CURETTAGE, HYSTEROSCOPY, ABLATE ENDOMETRIUM  Date of Surgery/ Procedure: 18  Time of Surgery/ Procedure: 10:20am  Hospital/Surgical Facility: [facility] [place]    Primary Physician: Edu Underwood [place]  Type of Anesthesia Anticipated: Local with MAC    Patient has a Health Care Directive or Living Will:  YES at home     1. NO - Do you have a history of heart attack, stroke, stent, bypass or surgery on an artery in the head, neck, heart or legs?  2. NO - Do you ever have any pain or discomfort in your chest?  3. NO - Do you have a history of  Heart Failure?  4. NO - Are you troubled by shortness of breath when: walking on the level, up a slight hill or at night?  5. NO - Do you currently have a cold, bronchitis or other respiratory infection?  6. NO - Do you have a cough, shortness of breath or wheezing?  7. NO - Do you sometimes get pains in the calves of your legs when you walk?  8. NO - Do you or anyone in your family have previous history of blood clots?  9. NO - Do you or does anyone in your family have a serious bleeding problem such as prolonged bleeding following surgeries or cuts?  10. Yes  - Have you ever had problems with anemia or been told to take iron pills?Patient is on iron pills   11. NO - Have you had any abnormal blood loss such as black, tarry or bloody stools, or abnormal vaginal bleeding?  12. NO - Have you ever had a blood transfusion?  13. NO - Have you or any of your relatives ever had problems with anesthesia?  14. NO - Do you have sleep  apnea, excessive snoring or daytime drowsiness?  15. NO - Do you have any prosthetic heart valves?  16. NO - Do you have prosthetic joints?  17. NO - Is there any chance that you may be pregnant?      HPI:     HPI related to upcoming procedure: Patient here for a pre op evaluation. She is having an endometrial ablation for excessive uterine bleeding. She denies any acute symptoms today. Says she feels well . Her BP is noted to be quite low. She says she is not symptomatic. She reports that this is not unusual for her. She runs low all the time. She is relatively  healthy       See problem list for active medical problems.  Problems all longstanding and stable, except as noted/documented.  See ROS for pertinent symptoms related to these conditions.                                                                                                                                                          .    MEDICAL HISTORY:     Patient Active Problem List    Diagnosis Date Noted     AR (allergic rhinitis) 08/01/2013     Priority: Medium     Lactose intolerance 08/01/2013     Priority: Medium     Lumbago 06/08/2011     Priority: Medium     Dysmenorrhea 10/26/2010     Priority: Medium     Migraine headache 07/12/2010     Priority: Medium     Blurred vision, vertigo prior to HA's.       Excessive or frequent menstruation 07/12/2010     Priority: Medium      Past Medical History:   Diagnosis Date     Arthritis     hands, knees     Chickenpox      Esophageal reflux      Headache 7/12/2010     Intestinal disaccharidase deficiencies and disaccharide malabsorption     Lactose Intolerant     Migraines      Other specified anemias     Hx of Anemia     PONV (postoperative nausea and vomiting)      Past Surgical History:   Procedure Laterality Date     ARTHROSCOPY KNEE RT/LT  1/2005    Left knee surgery     AS HYSTEROS W PERMANENT FALLOPAIN IMPLANT  2014    [facility]     C ORAL SURGERY PROCEDURE  age 18    [place] teeth     DILATION AND  CURETTAGE SUCTION  12/30/2010    DILATION AND CURETTAGE SUCTION performed by HENRIQUE RODRIGUEZ at [facility]     GYN SURGERY  2014    [facility]     TONSILLECTOMY Bilateral 7/25/2016    Procedure: TONSILLECTOMY;  Surgeon: Bradley Medrano MD;  Location:  OR     Current Outpatient Prescriptions   Medication Sig Dispense Refill     Cholecalciferol (VITAMIN D3 PO) Take 1,000 Units by mouth daily        Ferrous Sulfate (IRON SUPPLEMENT PO) Take 325 mg by mouth daily.       Magnesium Gluconate (MAGNESIUM 27 PO) Take 1,250 mcg by mouth        multivitamin, therapeutic (THERA-VIT) TABS tablet Take 1 tablet by mouth daily       Omega-3 Fatty Acids (OMEGA-3 FISH OIL PO) Take 1 g by mouth daily.       EPINEPHrine (EPIPEN) 0.3 MG/0.3ML injection Inject 0.3 mLs (0.3 mg) into the muscle once as needed for anaphylaxis (Patient not taking: Reported on 5/15/2018) 1 each 1     EPINEPHrine (EPIPEN/ADRENACLICK/OR ANY BX GENERIC EQUIV) 0.3 MG/0.3ML injection 2-pack Inject 0.3 mLs (0.3 mg) into the muscle as needed for anaphylaxis (severe allergic reaction) (Patient not taking: Reported on 6/13/2018) 0.6 mL 1     OTC products: None, except as noted above    Allergies   Allergen Reactions     Adhesive Tape Dermatitis     Beef-Derived Products GI Disturbance     Bees Hives     Swelling       Lactose Diarrhea     Morphine Nausea and Vomiting     Soy Allergy GI Disturbance     IBS       Latex Allergy: NO    Social History   Substance Use Topics     Smoking status: Never Smoker     Smokeless tobacco: Never Used     Alcohol use Yes      Comment: rare     History   Drug Use No       REVIEW OF SYSTEMS:   CONSTITUTIONAL: NEGATIVE for fever, chills, change in weight  INTEGUMENTARY/SKIN: NEGATIVE for worrisome rashes, moles or lesions  EYES: NEGATIVE for vision changes or irritation  ENT/MOUTH: NEGATIVE for ear, mouth and throat problems  RESP: NEGATIVE for significant cough or SOB  CV: NEGATIVE for chest pain, palpitations or peripheral edema  GI:  "NEGATIVE for nausea, abdominal pain, heartburn, or change in bowel habits   female: menses: irregular  PSYCHIATRIC: NEGATIVE for changes in mood or affect    EXAM:   BP (!) 86/58 (BP Location: Left arm, Cuff Size: Adult Regular)  Pulse 56  Temp 97.8  F (36.6  C) (Tympanic)  Ht 5' 5\" (1.651 m)  Wt 173 lb (78.5 kg)  LMP 07/04/2018  SpO2 97%  BMI 28.79 kg/m2    GENERAL APPEARANCE: healthy, alert and no distress     EYES: EOMI, PERRL     HENT: ear canals and TM's normal and nose and mouth without ulcers or lesions     NECK: no adenopathy, no asymmetry, masses, or scars and thyroid normal to palpation     RESP: lungs clear to auscultation - no rales, rhonchi or wheezes     CV: regular rates and rhythm, normal S1 S2, no S3 or S4 and no murmur, click or rub     ABDOMEN:  soft, nontender, no HSM or masses and bowel sounds normal     MS: extremities normal- no gross deformities noted, no evidence of inflammation in joints, FROM in all extremities.     SKIN: no suspicious lesions or rashes     PSYCH: mentation appears normal. and affect normal/bright     LYMPHATICS: No cervical adenopathy    DIAGNOSTICS:   EKG: Not indicated due to non-vascular surgery and low risk of event (age <65 and without cardiac risk factors)    Recent Labs   Lab Test  06/06/18   1518  05/15/18   1636   11/18/13   0820   HGB  13.9  13.1   < >  14.3   PLT  298  256   < >  227   NA   --   136   --   134   POTASSIUM   --   3.6   --   4.1   CR   --   0.94   --   0.61    < > = values in this interval not displayed.        IMPRESSION:   Reason for surgery/procedure: Abnormal uterine bleeding   Diagnosis/reason for consult: Pre op evaluation     The proposed surgical procedure is considered LOW risk.    REVISED CARDIAC RISK INDEX  The patient has the following serious cardiovascular risks for perioperative complications such as (MI, PE, VFib and 3  AV Block):  No serious cardiac risks  INTERPRETATION: 0 risks: Class I (very low risk - 0.4% " complication rate)    The patient has the following additional risks for perioperative complications:  No identified additional risks      ICD-10-CM    1. Preop general physical exam Z01.818    2. Abnormal uterine bleeding N93.9    3. Hypotension, unspecified hypotension type I95.9        RECOMMENDATIONS:       Cardiovascular Risk  Performs 4 METs exercise without symptoms (Light housework (dusting, washing dishes), Climb a flight of stairs and Walk on level ground at 15 minutes per mile (4 miles/hour)) .       Pulmonary Risk  Incentive spirometry post op  Respiratory Therapy (Respiratory Care IP Consult)  post op  NG tube decompression if abdominal distension or significant vomiting       Hypotension  Patient may need IV fluids prior to surgery       --Patient is to take all scheduled medications on the day of surgery EXCEPT for modifications listed below.    Asked to avoid aspirin and NSAIDs 5-7 days before surgery     APPROVAL GIVEN to proceed with proposed procedure, without further diagnostic evaluation       Signed Electronically by: Hoang Andrade MD    Copy of this evaluation report is provided to requesting physician.    [facility]du Preop Guidelines    Revised Cardiac Risk Index

## 2018-07-11 NOTE — PATIENT INSTRUCTIONS
Before Your Surgery      Call your surgeon if there is any change in your health. This includes signs of a cold or flu (such as a sore throat, runny nose, cough, rash or fever).    Do not smoke, drink alcohol or take over the counter medicine (unless your surgeon or primary care doctor tells you to) for the 24 hours before and after surgery.    If you take prescribed drugs: Follow your doctor s orders about which medicines to take and which to stop until after surgery.    Eating and drinking prior to surgery: follow the instructions from your surgeon  Take a shower or bath the night before surgery. Use the soap your surgeon gave you to gently clean your skin. If you do not have soap from your surgeon, use your regular soap. Do not shave or scrub the surgery site.  Wear clean pajamas and have clean sheets on your bed.   Presurgery Checklist  You are scheduled to have surgery. The healthcare staff will try to make your stay comfortable. Use the guidelines below to remind yourself what to do before surgery. Be sure to follow any specific pre-op instructions from your surgeon or nurse.  Preparing for Surgery  Ask your surgeon if you ll need a blood transfusion during surgery and if so, how to prepare for it. In some cases, you can donate blood before surgery. If needed, this blood can be given back (transfused) to you during or after surgery.  If you are having abdominal surgery, ask what you need to do to clear your bowel.  Tell your surgeon if you have allergies to any medications or foods.  Arrange for an adult family member or friend to drive you home after surgery. If possible, have someone ready to help you at home as you recover.  Call the surgeon if you get a cold, fever, sore throat, diarrhea, or other health problem just before surgery. Your surgeon can decide whether or not to postpone the surgery.  Medications  Tell your surgeon about all medications you take, including prescription and over-the-counter  products such as herbal remedies and vitamins. Ask if you should continue taking them.  If you take ibuprofen, naproxen, or  blood thinners  such as aspirin, clopidogrel (Plavix), or warfarin (Coumadin), ask your surgeon whether you should stop taking them and how long before surgery you should stop.  You may be told to take antibiotics just before surgery to prevent infection. If so, follow instructions carefully on how to take them.  If you are told to take medications called anticoagulants to prevent blood clots after surgery, be sure to follow the instructions on how to take them.  Stop Smoking  If you smoke, healing may take longer. So at least 2 week(s) before surgery, stop smoking.  Bathing or Showering Before Surgery  If instructed, wash with antibacterial soap. Afterward, do not use lotions or powders.  If you are having surgery on the head, you may be asked to shampoo with antibacterial soap. Follow instructions for doing so.  Do Not Remove Hair from the Surgery Site  Do not shave hair from the incision site, unless you are given specific instructions to do so. Usually, if hair needs to be removed, it will be done at the hospital right before surgery.  Don t Eat or Drink  Your doctor will tell you when to stop eating and drinking. If you do not follow your doctor's instructions, your procedure may be postponed or rescheduled for another day.  If your surgeon tells you to continue any medications, take them with small sips of water.  You can brush your teeth and rinse your mouth, but don t swallow any water.  Day of Surgery  Do not wear makeup. Do not use perfume, deodorant, or hairspray. Remove nail polish and artificial nails.  Leave jewelry (including rings), watches, and other valuables at home.  Be sure to bring health insurance cards or forms and a photo ID.  Bring a list of your medications (include the name, dose, how often you take them, and the time last dose was taken).  Arrive on time at the  hospital or surgery facility.    4280-1626 [employer]. [address] [place] [address], [place] [address]. All rights reserved. This information is not intended as a substitute for professional medical care. Always follow your healthcare professional's instructions.  This information has been modified by your health care provider with permission from the publisher.

## 2018-07-11 NOTE — MR AVS SNAPSHOT
After Visit Summary   7/11/2018    Amarilis Espinal    MRN: [id number]           Patient Information     Date Of Birth          1985        Visit Information        Provider Department      7/11/2018 7:00 AM Hoang Andrade MD [facility]        Today's Diagnoses     Preop general physical exam    -  1      Care Instructions      Before Your Surgery      Call your surgeon if there is any change in your health. This includes signs of a cold or flu (such as a sore throat, runny nose, cough, rash or fever).    Do not smoke, drink alcohol or take over the counter medicine (unless your surgeon or primary care doctor tells you to) for the 24 hours before and after surgery.    If you take prescribed drugs: Follow your doctor s orders about which medicines to take and which to stop until after surgery.    Eating and drinking prior to surgery: follow the instructions from your surgeon  Take a shower or bath the night before surgery. Use the soap your surgeon gave you to gently clean your skin. If you do not have soap from your surgeon, use your regular soap. Do not shave or scrub the surgery site.  Wear clean pajamas and have clean sheets on your bed.   Presurgery Checklist  You are scheduled to have surgery. The healthcare staff will try to make your stay comfortable. Use the guidelines below to remind yourself what to do before surgery. Be sure to follow any specific pre-op instructions from your surgeon or nurse.  Preparing for Surgery  Ask your surgeon if you ll need a blood transfusion during surgery and if so, how to prepare for it. In some cases, you can donate blood before surgery. If needed, this blood can be given back (transfused) to you during or after surgery.  If you are having abdominal surgery, ask what you need to do to clear your bowel.  Tell your surgeon if you have allergies to any medications or foods.  Arrange for an adult family member or friend to drive you  home after surgery. If possible, have someone ready to help you at home as you recover.  Call the surgeon if you get a cold, fever, sore throat, diarrhea, or other health problem just before surgery. Your surgeon can decide whether or not to postpone the surgery.  Medications  Tell your surgeon about all medications you take, including prescription and over-the-counter products such as herbal remedies and vitamins. Ask if you should continue taking them.  If you take ibuprofen, naproxen, or  blood thinners  such as aspirin, clopidogrel (Plavix), or warfarin (Coumadin), ask your surgeon whether you should stop taking them and how long before surgery you should stop.  You may be told to take antibiotics just before surgery to prevent infection. If so, follow instructions carefully on how to take them.  If you are told to take medications called anticoagulants to prevent blood clots after surgery, be sure to follow the instructions on how to take them.  Stop Smoking  If you smoke, healing may take longer. So at least 2 week(s) before surgery, stop smoking.  Bathing or Showering Before Surgery  If instructed, wash with antibacterial soap. Afterward, do not use lotions or powders.  If you are having surgery on the head, you may be asked to shampoo with antibacterial soap. Follow instructions for doing so.  Do Not Remove Hair from the Surgery Site  Do not shave hair from the incision site, unless you are given specific instructions to do so. Usually, if hair needs to be removed, it will be done at the hospital right before surgery.  Don t Eat or Drink  Your doctor will tell you when to stop eating and drinking. If you do not follow your doctor's instructions, your procedure may be postponed or rescheduled for another day.  If your surgeon tells you to continue any medications, take them with small sips of water.  You can brush your teeth and rinse your mouth, but don t swallow any water.  Day of Surgery  Do not wear  makeup. Do not use perfume, deodorant, or hairspray. Remove nail polish and artificial nails.  Leave jewelry (including rings), watches, and other valuables at home.  Be sure to bring health insurance cards or forms and a photo ID.  Bring a list of your medications (include the name, dose, how often you take them, and the time last dose was taken).  Arrive on time at the hospital or surgery facility.    1395-3062 [employer]. [address] [place] [address]. All rights reserved. This information is not intended as a substitute for professional medical care. Always follow your healthcare professional's instructions.  This information has been modified by your health care provider with permission from the publisher.              Follow-ups after your visit        Your next 10 appointments already scheduled     Jul 18, 2018   Procedure with Lucy Tate MD   [facility] (--)    [address] [place] [id number]   [phone]           The medical center is located at [address]. (between [address] and [address] in [place], [place] miles north of [place]).              Who to contact     If you have questions or need follow up information about today's clinic visit or your schedule please contact [facility] directly at [phone].  Normal or non-critical lab and imaging results will be communicated to you by MyChart, letter or phone within 4 business days after the clinic has received the results. If you do not hear from us within 7 days, please contact the clinic through MyChart or phone. If you have a critical or abnormal lab result, we will notify you by phone as soon as possible.  Submit refill requests through [employer] or call your pharmacy and they will forward the refill request to us. Please allow 3 business days for your refill to be completed.          Additional Information About Your Visit        MyChart Information     [employer]  "gives you secure access to your electronic health record. If you see a primary care provider, you can also send messages to your care team and make appointments. If you have questions, please call your primary care clinic.  If you do not have a primary care provider, please call [phone] and they will assist you.        Care EveryWhere ID     This is your Care EveryWhere ID. This could be used by other organizations to access your [facility] medical records  [id number]        Your Vitals Were     Pulse Temperature Height Last Period Pulse Oximetry BMI (Body Mass Index)    56 97.8  F (36.6  C) (Tympanic) 5' 5\" (1.651 m) 07/04/2018 97% 28.79 kg/m2       Blood Pressure from Last 3 Encounters:   07/11/18 (!) 86/58   07/09/18 113/54   06/13/18 102/65    Weight from Last 3 Encounters:   07/11/18 173 lb (78.5 kg)   07/09/18 176 lb 9.6 oz (80.1 kg)   06/13/18 174 lb (78.9 kg)              Today, you had the following     No orders found for display       Primary Care Provider Office Phone # Fax #    [facility] [phone] [address] [address]        Equal Access to Services     EBER CLARK : Hadii adriano khan hadasho Soomaali, waaxda luqadaha, qaybta kaalmada adeegyada, shonda idiin hayharriett lau . So [facility] [phone].    ATENCIÓN: Si habla español, tiene a groves disposición servicios gratuitos de asistencia lingüística. Llame al [phone].    We comply with applicable federal civil rights laws and [place] laws. We do not discriminate on the basis of race, color, national origin, age, disability, sex, sexual orientation, or gender identity.            Thank you!     Thank you for choosing [facility]  for your care. Our goal is always to provide you with excellent care. Hearing back from our patients is one way we can continue to improve our services. Please take a few minutes to complete the written survey that you may receive in the mail " after your visit with us. Thank you!             Your Updated Medication List - Protect others around you: Learn how to safely use, store and throw away your medicines at www.disposemymeds.org.          This list is accurate as of 7/11/18  7:31 AM.  Always use your most recent med list.                   Brand Name Dispense Instructions for use Diagnosis    * EPINEPHrine 0.3 MG/0.3ML injection 2-pack    EPIPEN/ADRENACLICK/or ANY BX GENERIC EQUIV    1 each    Inject 0.3 mLs (0.3 mg) into the muscle once as needed for anaphylaxis        * EPINEPHrine 0.3 MG/0.3ML injection 2-pack    EPIPEN/ADRENACLICK/or ANY BX GENERIC EQUIV    0.6 mL    Inject 0.3 mLs (0.3 mg) into the muscle as needed for anaphylaxis (severe allergic reaction)        IRON SUPPLEMENT PO      Take 325 mg by mouth daily.        MAGNESIUM 27 PO      Take 1,250 mcg by mouth        multivitamin, therapeutic Tabs tablet      Take 1 tablet by mouth daily        OMEGA-3 FISH OIL PO      Take 1 g by mouth daily.        VITAMIN D3 PO      Take 1,000 Units by mouth daily        * Notice:  This list has 2 medication(s) that are the same as other medications prescribed for you. Read the directions carefully, and ask your doctor or other care provider to review them with you.

## 2018-07-18 ENCOUNTER — SURGERY (OUTPATIENT)
Age: 33
End: 2018-07-18

## 2018-07-18 ENCOUNTER — HOSPITAL ENCOUNTER (OUTPATIENT)
Facility: CLINIC | Age: 33
Discharge: HOME | End: 2018-07-18
Attending: OBSTETRICS & GYNECOLOGY | Admitting: OBSTETRICS & GYNECOLOGY
Payer: OTHER GOVERNMENT

## 2018-07-18 ENCOUNTER — ANESTHESIA (OUTPATIENT)
Dept: SURGERY | Facility: CLINIC | Age: 33
End: 2018-07-18
Payer: OTHER GOVERNMENT

## 2018-07-18 VITALS
BODY MASS INDEX: 28.82 KG/M2 | TEMPERATURE: 98 F | SYSTOLIC BLOOD PRESSURE: 114 MMHG | WEIGHT: 173 LBS | RESPIRATION RATE: 16 BRPM | HEIGHT: 65 IN | DIASTOLIC BLOOD PRESSURE: 73 MMHG | OXYGEN SATURATION: 100 % | HEART RATE: 50 BPM

## 2018-07-18 DIAGNOSIS — N92.0 EXCESSIVE OR FREQUENT MENSTRUATION: Primary | ICD-10-CM

## 2018-07-18 LAB — HCG UR QL: NEGATIVE

## 2018-07-18 PROCEDURE — 36000058 ZZH SURGERY LEVEL 3 EA 15 ADDTL MIN: Performed by: OBSTETRICS & GYNECOLOGY

## 2018-07-18 PROCEDURE — C1888 ENDOVAS NON-CARDIAC ABL CATH: HCPCS | Performed by: OBSTETRICS & GYNECOLOGY

## 2018-07-18 PROCEDURE — 27210794 ZZH OR GENERAL SUPPLY STERILE: Performed by: OBSTETRICS & GYNECOLOGY

## 2018-07-18 PROCEDURE — 58563 HYSTEROSCOPY ABLATION: CPT | Performed by: OBSTETRICS & GYNECOLOGY

## 2018-07-18 PROCEDURE — 40000305 ZZH STATISTIC PRE PROC ASSESS I: Performed by: OBSTETRICS & GYNECOLOGY

## 2018-07-18 PROCEDURE — 81025 URINE PREGNANCY TEST: CPT | Performed by: NURSE ANESTHETIST, CERTIFIED REGISTERED

## 2018-07-18 PROCEDURE — 88305 TISSUE EXAM BY PATHOLOGIST: CPT | Performed by: OBSTETRICS & GYNECOLOGY

## 2018-07-18 PROCEDURE — 25000128 H RX IP 250 OP 636: Performed by: NURSE ANESTHETIST, CERTIFIED REGISTERED

## 2018-07-18 PROCEDURE — 88305 TISSUE EXAM BY PATHOLOGIST: CPT | Mod: 26 | Performed by: OBSTETRICS & GYNECOLOGY

## 2018-07-18 PROCEDURE — 25000132 ZZH RX MED GY IP 250 OP 250 PS 637: Performed by: OBSTETRICS & GYNECOLOGY

## 2018-07-18 PROCEDURE — 36000056 ZZH SURGERY LEVEL 3 1ST 30 MIN: Performed by: OBSTETRICS & GYNECOLOGY

## 2018-07-18 PROCEDURE — 37000009 ZZH ANESTHESIA TECHNICAL FEE, EACH ADDTL 15 MIN: Performed by: OBSTETRICS & GYNECOLOGY

## 2018-07-18 PROCEDURE — 71000027 ZZH RECOVERY PHASE 2 EACH 15 MINS: Performed by: OBSTETRICS & GYNECOLOGY

## 2018-07-18 PROCEDURE — 25000125 ZZHC RX 250: Performed by: OBSTETRICS & GYNECOLOGY

## 2018-07-18 PROCEDURE — 25000125 ZZHC RX 250: Performed by: NURSE ANESTHETIST, CERTIFIED REGISTERED

## 2018-07-18 PROCEDURE — 37000008 ZZH ANESTHESIA TECHNICAL FEE, 1ST 30 MIN: Performed by: OBSTETRICS & GYNECOLOGY

## 2018-07-18 RX ORDER — ACETAMINOPHEN 325 MG/1
975 TABLET ORAL ONCE
Status: COMPLETED | OUTPATIENT
Start: 2018-07-18 | End: 2018-07-18

## 2018-07-18 RX ORDER — SODIUM CHLORIDE, SODIUM LACTATE, POTASSIUM CHLORIDE, CALCIUM CHLORIDE 600; 310; 30; 20 MG/100ML; MG/100ML; MG/100ML; MG/100ML
INJECTION, SOLUTION INTRAVENOUS CONTINUOUS
Status: DISCONTINUED | OUTPATIENT
Start: 2018-07-18 | End: 2018-07-18 | Stop reason: HOSPADM

## 2018-07-18 RX ORDER — HYDROXYZINE HYDROCHLORIDE 25 MG/1
25 TABLET, FILM COATED ORAL
Status: DISCONTINUED | OUTPATIENT
Start: 2018-07-18 | End: 2018-07-18 | Stop reason: HOSPADM

## 2018-07-18 RX ORDER — LIDOCAINE HYDROCHLORIDE 10 MG/ML
INJECTION, SOLUTION INFILTRATION; PERINEURAL PRN
Status: DISCONTINUED | OUTPATIENT
Start: 2018-07-18 | End: 2018-07-18

## 2018-07-18 RX ORDER — PROPOFOL 10 MG/ML
INJECTION, EMULSION INTRAVENOUS PRN
Status: DISCONTINUED | OUTPATIENT
Start: 2018-07-18 | End: 2018-07-18

## 2018-07-18 RX ORDER — NALOXONE HYDROCHLORIDE 0.4 MG/ML
.1-.4 INJECTION, SOLUTION INTRAMUSCULAR; INTRAVENOUS; SUBCUTANEOUS
Status: DISCONTINUED | OUTPATIENT
Start: 2018-07-18 | End: 2018-07-18 | Stop reason: HOSPADM

## 2018-07-18 RX ORDER — PROPOFOL 10 MG/ML
INJECTION, EMULSION INTRAVENOUS CONTINUOUS PRN
Status: DISCONTINUED | OUTPATIENT
Start: 2018-07-18 | End: 2018-07-18

## 2018-07-18 RX ORDER — IBUPROFEN 600 MG/1
600 TABLET, FILM COATED ORAL
Status: DISCONTINUED | OUTPATIENT
Start: 2018-07-18 | End: 2018-07-18 | Stop reason: HOSPADM

## 2018-07-18 RX ORDER — ONDANSETRON 4 MG/1
4 TABLET, ORALLY DISINTEGRATING ORAL EVERY 30 MIN PRN
Status: DISCONTINUED | OUTPATIENT
Start: 2018-07-18 | End: 2018-07-18 | Stop reason: HOSPADM

## 2018-07-18 RX ORDER — DEXAMETHASONE SODIUM PHOSPHATE 4 MG/ML
INJECTION, SOLUTION INTRA-ARTICULAR; INTRALESIONAL; INTRAMUSCULAR; INTRAVENOUS; SOFT TISSUE PRN
Status: DISCONTINUED | OUTPATIENT
Start: 2018-07-18 | End: 2018-07-18

## 2018-07-18 RX ORDER — IBUPROFEN 600 MG/1
600 TABLET, FILM COATED ORAL EVERY 6 HOURS PRN
Qty: 30 TABLET | Refills: 0 | Status: SHIPPED | OUTPATIENT
Start: 2018-07-18 | End: 2021-02-03

## 2018-07-18 RX ORDER — LIDOCAINE HYDROCHLORIDE 10 MG/ML
INJECTION, SOLUTION INFILTRATION; PERINEURAL PRN
Status: DISCONTINUED | OUTPATIENT
Start: 2018-07-18 | End: 2018-07-18 | Stop reason: HOSPADM

## 2018-07-18 RX ORDER — OXYCODONE HYDROCHLORIDE 5 MG/1
5 TABLET ORAL
Status: DISCONTINUED | OUTPATIENT
Start: 2018-07-18 | End: 2018-07-18 | Stop reason: HOSPADM

## 2018-07-18 RX ORDER — ACETAMINOPHEN 325 MG/1
650 TABLET ORAL EVERY 4 HOURS PRN
Qty: 100 TABLET | Refills: 0 | COMMUNITY
Start: 2018-07-18 | End: 2021-02-03

## 2018-07-18 RX ORDER — KETOROLAC TROMETHAMINE 30 MG/ML
INJECTION, SOLUTION INTRAMUSCULAR; INTRAVENOUS PRN
Status: DISCONTINUED | OUTPATIENT
Start: 2018-07-18 | End: 2018-07-18

## 2018-07-18 RX ORDER — OXYCODONE HYDROCHLORIDE 5 MG/1
5-10 TABLET ORAL
Qty: 15 TABLET | Refills: 0 | Status: SHIPPED | OUTPATIENT
Start: 2018-07-18 | End: 2019-10-13

## 2018-07-18 RX ORDER — FENTANYL CITRATE 50 UG/ML
INJECTION, SOLUTION INTRAMUSCULAR; INTRAVENOUS PRN
Status: DISCONTINUED | OUTPATIENT
Start: 2018-07-18 | End: 2018-07-18

## 2018-07-18 RX ORDER — FENTANYL CITRATE 50 UG/ML
25-50 INJECTION, SOLUTION INTRAMUSCULAR; INTRAVENOUS
Status: DISCONTINUED | OUTPATIENT
Start: 2018-07-18 | End: 2018-07-18 | Stop reason: HOSPADM

## 2018-07-18 RX ORDER — GLYCOPYRROLATE 0.2 MG/ML
INJECTION, SOLUTION INTRAMUSCULAR; INTRAVENOUS PRN
Status: DISCONTINUED | OUTPATIENT
Start: 2018-07-18 | End: 2018-07-18

## 2018-07-18 RX ORDER — ONDANSETRON 2 MG/ML
INJECTION INTRAMUSCULAR; INTRAVENOUS PRN
Status: DISCONTINUED | OUTPATIENT
Start: 2018-07-18 | End: 2018-07-18

## 2018-07-18 RX ORDER — MEPERIDINE HYDROCHLORIDE 50 MG/ML
12.5 INJECTION INTRAMUSCULAR; INTRAVENOUS; SUBCUTANEOUS
Status: DISCONTINUED | OUTPATIENT
Start: 2018-07-18 | End: 2018-07-18 | Stop reason: HOSPADM

## 2018-07-18 RX ORDER — OXYCODONE HCL 10 MG/1
10 TABLET, FILM COATED, EXTENDED RELEASE ORAL ONCE
Status: COMPLETED | OUTPATIENT
Start: 2018-07-18 | End: 2018-07-18

## 2018-07-18 RX ORDER — ONDANSETRON 2 MG/ML
4 INJECTION INTRAMUSCULAR; INTRAVENOUS EVERY 30 MIN PRN
Status: DISCONTINUED | OUTPATIENT
Start: 2018-07-18 | End: 2018-07-18 | Stop reason: HOSPADM

## 2018-07-18 RX ORDER — ONDANSETRON 4 MG/1
4 TABLET, ORALLY DISINTEGRATING ORAL
Status: DISCONTINUED | OUTPATIENT
Start: 2018-07-18 | End: 2018-07-18 | Stop reason: HOSPADM

## 2018-07-18 RX ADMIN — FENTANYL CITRATE 50 MCG: 50 INJECTION, SOLUTION INTRAMUSCULAR; INTRAVENOUS at 10:13

## 2018-07-18 RX ADMIN — LIDOCAINE HYDROCHLORIDE 10 ML: 10 INJECTION, SOLUTION INFILTRATION; PERINEURAL at 10:28

## 2018-07-18 RX ADMIN — ACETAMINOPHEN 975 MG: 325 TABLET, FILM COATED ORAL at 09:07

## 2018-07-18 RX ADMIN — GLYCOPYRROLATE 0.2 MG: 0.2 INJECTION, SOLUTION INTRAMUSCULAR; INTRAVENOUS at 10:18

## 2018-07-18 RX ADMIN — KETOROLAC TROMETHAMINE 30 MG: 30 INJECTION, SOLUTION INTRAMUSCULAR at 10:31

## 2018-07-18 RX ADMIN — ONDANSETRON 4 MG: 2 INJECTION INTRAMUSCULAR; INTRAVENOUS at 10:18

## 2018-07-18 RX ADMIN — OXYCODONE HYDROCHLORIDE 10 MG: 10 TABLET, FILM COATED, EXTENDED RELEASE ORAL at 09:21

## 2018-07-18 RX ADMIN — LIDOCAINE HYDROCHLORIDE 0.1 ML: 10 INJECTION, SOLUTION EPIDURAL; INFILTRATION; INTRACAUDAL; PERINEURAL at 09:33

## 2018-07-18 RX ADMIN — DEXAMETHASONE SODIUM PHOSPHATE 8 MG: 4 INJECTION, SOLUTION INTRA-ARTICULAR; INTRALESIONAL; INTRAMUSCULAR; INTRAVENOUS; SOFT TISSUE at 10:18

## 2018-07-18 RX ADMIN — SODIUM CHLORIDE, POTASSIUM CHLORIDE, SODIUM LACTATE AND CALCIUM CHLORIDE: 600; 310; 30; 20 INJECTION, SOLUTION INTRAVENOUS at 09:31

## 2018-07-18 RX ADMIN — PROPOFOL 100 MCG/KG/MIN: 10 INJECTION, EMULSION INTRAVENOUS at 10:18

## 2018-07-18 RX ADMIN — PROPOFOL 30 MG: 10 INJECTION, EMULSION INTRAVENOUS at 10:32

## 2018-07-18 RX ADMIN — MIDAZOLAM 2 MG: 1 INJECTION INTRAMUSCULAR; INTRAVENOUS at 10:13

## 2018-07-18 RX ADMIN — FENTANYL CITRATE 50 MCG: 50 INJECTION, SOLUTION INTRAMUSCULAR; INTRAVENOUS at 10:15

## 2018-07-18 RX ADMIN — LIDOCAINE HYDROCHLORIDE 50 MG: 10 INJECTION, SOLUTION INFILTRATION; PERINEURAL at 10:18

## 2018-07-18 NOTE — ANESTHESIA PREPROCEDURE EVALUATION
Anesthesia Evaluation     . Pt has had prior anesthetic. Type: General and MAC    History of anesthetic complications   - PONV        ROS/MED HX    ENT/Pulmonary:     (+)allergic rhinitis, , . .    Neurologic:     (+)migraines,     Cardiovascular:  - neg cardiovascular ROS       METS/Exercise Tolerance:  >4 METS   Hematologic:     (+) Anemia, -      Musculoskeletal:   (+) arthritis, , , -       GI/Hepatic:     (+) GERD       Renal/Genitourinary:  - ROS Renal section negative       Endo:  - neg endo ROS       Psychiatric:  - neg psychiatric ROS       Infectious Disease:  - neg infectious disease ROS       Malignancy:      - no malignancy   Other: Comment: Results for MECHE CHAMPAGNE (MRN [id number]) as of 7/18/2018 09:10    6/6/2018 15:18  WBC: 11.1 (H)  Hemoglobin: 13.9  Hematocrit: 40.3  Platelet Count: 298  RBC Count: 4.51  MCV: 89  MCH: 30.8  MCHC: 34.5  RDW: 11.8  Diff Method: Automated Method  % Neutrophils: 65.3  % Lymphocytes: 25.5  % Monocytes: 6.0  % Eosinophils: 1.8  % Basophils: 1.4  Absolute Neutrophil: 7.2  Absolute Lymphocytes: 2.8  Absolute Monocytes: 0.7  Absolute Eosinophils: 0.2  Absolute Basophils: 0.2  Beta HCG Qual IFA Urine: Negative                                     Anesthesia Plan      History & Physical Review  History and physical reviewed and following examination; no interval change.    ASA Status:  2 .    NPO Status:  > 8 hours    Plan for MAC with Intravenous and Propofol induction. Reason for MAC:  Deep or markedly invasive procedure (G8)  PONV prophylaxis:  Ondansetron (or other 5HT-3) and Dexamethasone or Solumedrol       Postoperative Care  Postoperative pain management:  IV analgesics and Oral pain medications.      Consents  Anesthetic plan, risks, benefits and alternatives discussed with:  Patient.  Use of blood products discussed: No .   .                          .

## 2018-07-18 NOTE — ANESTHESIA POSTPROCEDURE EVALUATION
Patient: Amarilis Espinal    Procedure(s):  Dilation and curettage, Hysteroscopy, endometrial ablation. ([place]) - Wound Class: II-Clean Contaminated    Diagnosis:Heavy vaginal bleeding  Diagnosis Additional Information: No value filed.    Anesthesia Type:  MAC    Note:  Anesthesia Post Evaluation    Patient location during evaluation: Bedside  Patient participation: Able to fully participate in evaluation  Level of consciousness: awake and alert  Pain management: adequate  Airway patency: patent  Cardiovascular status: acceptable  Respiratory status: acceptable  Hydration status: acceptable  PONV: none     Anesthetic complications: None          Last vitals:  Vitals:    07/18/18 1115 07/18/18 1130 07/18/18 1140   BP: 108/76 114/73    Pulse:   50   Resp:   16   Temp:   36.7  C (98  F)   SpO2: 100% 100% 100%         Electronically Signed By: JINA Fitch CRNA  July 18, 2018  1:32 PM

## 2018-07-18 NOTE — IP AVS SNAPSHOT
MRN:[id number]                      After Visit Summary   7/18/2018    Amarilis Espinal    MRN: [id number]           Thank you!     Thank you for choosing [facility] for your care. Our goal is always to provide you with excellent care. Hearing back from our patients is one way we can continue to improve our services. Please take a few minutes to complete the written survey that you may receive in the mail after you visit with us. Thank you!        Patient Information     Date Of Birth          1985        About your hospital stay     You were admitted on:  July 18, 2018 You last received care in the:  [facility] PreOP/Phase II    You were discharged on:  July 18, 2018       Who to Call     For medical emergencies, please call 911.  For non-urgent questions about your medical care, please call your primary care provider or clinic, [phone]  For questions related to your surgery, please call your surgery clinic        Attending Provider     Provider Lucy Rico MD OB/Gyn       Primary Care Provider Office Phone # Fax #    [facility] [phone]      After Care Instructions     Discharge Instructions       Pelvic Rest. No tampons, douching or intercourse for  2 weeks.            Discharge Instructions       Patient to arrange follow up appointment in 2 Weeks as needed                  Further instructions from your care team                           Anesthesia   Same Day Surgery Discharge Instructions  Special Precautions After Surgery - Adult    1. It is not unusual to feel lightheaded or faint, up to 24 hours after surgery or while taking pain medication.  If you have these symptoms; sit for a few minutes before standing and have someone assist you when getting up.  2. You should rest and relax for the next 24 hours and must have someone stay with you for at least 24 hours after your discharge.  3. DO NOT DRIVE any vehicle or operate  "mechanical equipment for 24 hours following the end of your surgery.  DO NOT DRIVE while taking narcotic pain medications that have been prescribed by your physician.  If you had a limb operated on, you must be able to use it fully to drive.  4. DO NOT drink alcoholic beverages for 24 hours following surgery or while taking prescription pain medication.  5. Drink clear liquids (apple juice, ginger ale, broth, 7-Up, etc.).  Progress to your regular diet as you feel able.  6. Any questions call your physician and do not make important decisions for 24 hours.    __________________________________________________________________________________________________________________________________  IMPORTANT NUMBERS:    [facility] Main Number:  [phone], [phone]  Pharmacy:  [phone]      OB Clinic:  [phone] call with any questions/concerns and for follow up   Urgent Care:  [phone]  Emergency Room:  [phone]    Call AFTER HOURS with any questions/concerns         Pending Results     Date and Time Order Name Status Description    7/18/2018 1029 Surgical pathology exam In process             Admission Information     Date & Time Provider Department Dept. Phone    7/18/2018 Lucy Tate MD [facility] PreOP/Phase [phone]      Your Vitals Were     Blood Pressure Pulse Temperature Respirations Height Weight    114/73 50 98  F (36.7  C) (Oral) 16 1.651 m (5' 5\") 78.5 kg (173 lb)    Last Period Pulse Oximetry BMI (Body Mass Index)             07/04/2018 100% 28.79 kg/m2         MyChart Information     [employer] gives you secure access to your electronic health record. If you see a primary care provider, you can also send messages to your care team and make appointments. If you have questions, please call your primary care clinic.  If you do not have a primary care provider, please call [phone] and they will assist you.        Care EveryWhere ID     This is your Care EveryWhere ID. This could " be used by other organizations to access your [facility] medical records  [id number]        Equal Access to Services     EBER CLARK : Hadii adriano Yee, cathy acevedo, amrita tothvivianeconsuelo edouard, shonda simmonsmichaelalcon tsai. So [facility] [phone].    ATENCIÓN: Si habla español, tiene a groves disposición servicios gratuitos de asistencia lingüística. Llame al [phone].    We comply with applicable federal civil rights laws and [place] laws. We do not discriminate on the basis of race, color, national origin, age, disability, sex, sexual orientation, or gender identity.               Review of your medicines      START taking        Dose / Directions    acetaminophen 325 MG tablet   Commonly known as:  TYLENOL   Used for:  Excessive or frequent menstruation        Dose:  650 mg   Take 2 tablets (650 mg) by mouth every 4 hours as needed for other (mild pain)   Quantity:  100 tablet   Refills:  0       ibuprofen 600 MG tablet   Commonly known as:  ADVIL/MOTRIN   Used for:  Excessive or frequent menstruation        Dose:  600 mg   Take 1 tablet (600 mg) by mouth every 6 hours as needed for pain (mild)   Quantity:  30 tablet   Refills:  0       oxyCODONE IR 5 MG tablet   Commonly known as:  ROXICODONE   Used for:  Excessive or frequent menstruation        Dose:  5-10 mg   Take 1-2 tablets (5-10 mg) by mouth every 3 hours as needed for pain or other (Moderate to Severe)   Quantity:  15 tablet   Refills:  0         CONTINUE these medicines which have NOT CHANGED        Dose / Directions    * EPINEPHrine 0.3 MG/0.3ML injection 2-pack   Commonly known as:  EPIPEN/ADRENACLICK/or ANY BX GENERIC EQUIV        Dose:  0.3 mg   Inject 0.3 mLs (0.3 mg) into the muscle once as needed for anaphylaxis   Quantity:  1 each   Refills:  1       * EPINEPHrine 0.3 MG/0.3ML injection 2-pack   Commonly known as:  EPIPEN/ADRENACLICK/or ANY BX GENERIC EQUIV        Dose:  0.3 mg   Inject 0.3 mLs (0.3 mg) into the  muscle as needed for anaphylaxis (severe allergic reaction)   Quantity:  0.6 mL   Refills:  1       IRON SUPPLEMENT PO        Dose:  325 mg   Take 325 mg by mouth daily.   Refills:  0       MAGNESIUM 27 PO        Dose:  1250 mcg   Take 1,250 mcg by mouth   Refills:  0       multivitamin, therapeutic Tabs tablet        Dose:  1 tablet   Take 1 tablet by mouth daily   Refills:  0       OMEGA-3 FISH OIL PO        Dose:  1 g   Take 1 g by mouth daily.   Refills:  0       VITAMIN D3 PO        Dose:  1000 Units   Take 1,000 Units by mouth daily   Refills:  0       * Notice:  This list has 2 medication(s) that are the same as other medications prescribed for you. Read the directions carefully, and ask your doctor or other care provider to review them with you.         Where to get your medicines      These medications were sent to [facility] [address]  [address] [address]     Phone:  [phone]     ibuprofen 600 MG tablet         Some of these will need a paper prescription and others can be bought over the counter. Ask your nurse if you have questions.     Bring a paper prescription for each of these medications     oxyCODONE IR 5 MG tablet       You don't need a prescription for these medications     acetaminophen 325 MG tablet                Protect others around you: Learn how to safely use, store and throw away your medicines at www.disposemymeds.org.        Information about OPIOIDS     PRESCRIPTION OPIOIDS: WHAT YOU NEED TO KNOW   We gave you an opioid (narcotic) pain medicine. It is important to manage your pain, but opioids are not always the best choice. You should first try all the other options your care team gave you. Take this medicine for as short a time (and as few doses) as possible.     These medicines have risks:    DO NOT drive when on new or higher doses of pain medicine. These medicines can affect your alertness and reaction times, and you  could be arrested for driving under the influence (DUI). If you need to use opioids long-term, talk to your care team about driving.    DO NOT operate heave machinery    DO NOT do any other dangerous activities while taking these medicines.     DO NOT drink any alcohol while taking these medicines.      If the opioid prescribed includes acetaminophen, DO NOT take with any other medicines that contain acetaminophen. Read all labels carefully. Look for the word  acetaminophen  or  Tylenol.  Ask your pharmacist if you have questions or are unsure.    You can get addicted to pain medicines, especially if you have a history of addiction (chemical, alcohol or substance dependence). Talk to your care team about ways to reduce this risk.    Store your pills in a secure place, locked if possible. We will not replace any lost or stolen medicine. If you don t finish your medicine, please throw away (dispose) as directed by your pharmacist. The [place] Pollution Control Agency has more information about safe disposal: https://www.pca.[facility].[place].us/living-green/managing-unwanted-medications.     All opioids tend to cause constipation. Drink plenty of water and eat foods that have a lot of fiber, such as fruits, vegetables, prune juice, apple juice and high-fiber cereal. Take a laxative (Miralax, milk of magnesia, Colace, Senna) if you don t move your bowels at least every other day.              Medication List: This is a list of all your medications and when to take them. Check marks below indicate your daily home schedule. Keep this list as a reference.      Medications           Morning Afternoon Evening Bedtime As Needed    acetaminophen 325 MG tablet   Commonly known as:  TYLENOL   Take 2 tablets (650 mg) by mouth every 4 hours as needed for other (mild pain)   Last time this was given:  975 mg on 7/18/2018  9:07 AM                                * EPINEPHrine 0.3 MG/0.3ML injection 2-pack   Commonly known as:   EPIPEN/ADRENACLICK/or ANY BX GENERIC EQUIV   Inject 0.3 mLs (0.3 mg) into the muscle once as needed for anaphylaxis                                * EPINEPHrine 0.3 MG/0.3ML injection 2-pack   Commonly known as:  EPIPEN/ADRENACLICK/or ANY BX GENERIC EQUIV   Inject 0.3 mLs (0.3 mg) into the muscle as needed for anaphylaxis (severe allergic reaction)                                ibuprofen 600 MG tablet   Commonly known as:  ADVIL/MOTRIN   Take 1 tablet (600 mg) by mouth every 6 hours as needed for pain (mild)                                IRON SUPPLEMENT PO   Take 325 mg by mouth daily.                                MAGNESIUM 27 PO   Take 1,250 mcg by mouth                                multivitamin, therapeutic Tabs tablet   Take 1 tablet by mouth daily                                OMEGA-3 FISH OIL PO   Take 1 g by mouth daily.                                oxyCODONE IR 5 MG tablet   Commonly known as:  ROXICODONE   Take 1-2 tablets (5-10 mg) by mouth every 3 hours as needed for pain or other (Moderate to Severe)                                VITAMIN D3 PO   Take 1,000 Units by mouth daily                                * Notice:  This list has 2 medication(s) that are the same as other medications prescribed for you. Read the directions carefully, and ask your doctor or other care provider to review them with you.

## 2018-07-18 NOTE — H&P (VIEW-ONLY)
[facility]  [address] [address]  [phone]  Dept: [phone]    PRE-OP EVALUATION:  Today's date: 2018    Amarilis Espinal (: 1985) presents for pre-operative evaluation assessment as requested by Dr. Tate .  She requires evaluation and anesthesia risk assessment prior to undergoing surgery/procedure for treatment of endometrial ablation.  .    Proposed Surgery/ Procedure: COMBINED DILATION AND CURETTAGE, HYSTEROSCOPY, ABLATE ENDOMETRIUM  Date of Surgery/ Procedure: 18  Time of Surgery/ Procedure: 10:20am  [place] [facility] [place] Physician: Edu Underwood [place]  Type of Anesthesia Anticipated: Local with MAC    Patient has a Health Care Directive or Living Will:  YES at home     1. NO - Do you have a history of heart attack, stroke, stent, bypass or surgery on an artery in the head, neck, heart or legs?  2. NO - Do you ever have any pain or discomfort in your chest?  3. NO - Do you have a history of  Heart Failure?  4. NO - Are you troubled by shortness of breath when: walking on the level, up a slight hill or at night?  5. NO - Do you currently have a cold, bronchitis or other respiratory infection?  6. NO - Do you have a cough, shortness of breath or wheezing?  7. NO - Do you sometimes get pains in the calves of your legs when you walk?  8. NO - Do you or anyone in your family have previous history of blood clots?  9. NO - Do you or does anyone in your family have a serious bleeding problem such as prolonged bleeding following surgeries or cuts?  10. Yes  - Have you ever had problems with anemia or been told to take iron pills?Patient is on iron pills   11. NO - Have you had any abnormal blood loss such as black, tarry or bloody stools, or abnormal vaginal bleeding?  12. NO - Have you ever had a blood transfusion?  13. NO - Have you or any of your relatives ever had problems with anesthesia?  14. NO - Do you have sleep  apnea, excessive snoring or daytime drowsiness?  15. NO - Do you have any prosthetic heart valves?  16. NO - Do you have prosthetic joints?  17. NO - Is there any chance that you may be pregnant?      HPI:     HPI related to upcoming procedure: Patient here for a pre op evaluation. She is having an endometrial ablation for excessive uterine bleeding. She denies any acute symptoms today. Says she feels well . Her BP is noted to be quite low. She says she is not symptomatic. She reports that this is not unusual for her. She runs low all the time. She is relatively  healthy       See problem list for active medical problems.  Problems all longstanding and stable, except as noted/documented.  See ROS for pertinent symptoms related to these conditions.                                                                                                                                                          .    MEDICAL HISTORY:     Patient Active Problem List    Diagnosis Date Noted     AR (allergic rhinitis) 08/01/2013     Priority: Medium     Lactose intolerance 08/01/2013     Priority: Medium     Lumbago 06/08/2011     Priority: Medium     Dysmenorrhea 10/26/2010     Priority: Medium     Migraine headache 07/12/2010     Priority: Medium     Blurred vision, vertigo prior to HA's.       Excessive or frequent menstruation 07/12/2010     Priority: Medium      Past Medical History:   Diagnosis Date     Arthritis     hands, knees     Chickenpox      Esophageal reflux      Headache 7/12/2010     Intestinal disaccharidase deficiencies and disaccharide malabsorption     Lactose Intolerant     Migraines      Other specified anemias     Hx of Anemia     PONV (postoperative nausea and vomiting)      Past Surgical History:   Procedure Laterality Date     ARTHROSCOPY KNEE RT/LT  1/2005    Left knee surgery     AS HYSTEROS W PERMANENT FALLOPAIN IMPLANT  2014    [facility]     C ORAL SURGERY PROCEDURE  age 18    [place] teeth     DILATION AND  CURETTAGE SUCTION  12/30/2010    DILATION AND CURETTAGE SUCTION performed by HENRIQUE RODRIGUEZ at [facility]     GYN SURGERY  2014    [facility]     TONSILLECTOMY Bilateral 7/25/2016    Procedure: TONSILLECTOMY;  Surgeon: Bradley Medrano MD;  Location:  OR     Current Outpatient Prescriptions   Medication Sig Dispense Refill     Cholecalciferol (VITAMIN D3 PO) Take 1,000 Units by mouth daily        Ferrous Sulfate (IRON SUPPLEMENT PO) Take 325 mg by mouth daily.       Magnesium Gluconate (MAGNESIUM 27 PO) Take 1,250 mcg by mouth        multivitamin, therapeutic (THERA-VIT) TABS tablet Take 1 tablet by mouth daily       Omega-3 Fatty Acids (OMEGA-3 FISH OIL PO) Take 1 g by mouth daily.       EPINEPHrine (EPIPEN) 0.3 MG/0.3ML injection Inject 0.3 mLs (0.3 mg) into the muscle once as needed for anaphylaxis (Patient not taking: Reported on 5/15/2018) 1 each 1     EPINEPHrine (EPIPEN/ADRENACLICK/OR ANY BX GENERIC EQUIV) 0.3 MG/0.3ML injection 2-pack Inject 0.3 mLs (0.3 mg) into the muscle as needed for anaphylaxis (severe allergic reaction) (Patient not taking: Reported on 6/13/2018) 0.6 mL 1     OTC products: None, except as noted above    Allergies   Allergen Reactions     Adhesive Tape Dermatitis     Beef-Derived Products GI Disturbance     Bees Hives     Swelling       Lactose Diarrhea     Morphine Nausea and Vomiting     Soy Allergy GI Disturbance     IBS       Latex Allergy: NO    Social History   Substance Use Topics     Smoking status: Never Smoker     Smokeless tobacco: Never Used     Alcohol use Yes      Comment: rare     History   Drug Use No       REVIEW OF SYSTEMS:   CONSTITUTIONAL: NEGATIVE for fever, chills, change in weight  INTEGUMENTARY/SKIN: NEGATIVE for worrisome rashes, moles or lesions  EYES: NEGATIVE for vision changes or irritation  ENT/MOUTH: NEGATIVE for ear, mouth and throat problems  RESP: NEGATIVE for significant cough or SOB  CV: NEGATIVE for chest pain, palpitations or peripheral edema  GI:  "NEGATIVE for nausea, abdominal pain, heartburn, or change in bowel habits   female: menses: irregular  PSYCHIATRIC: NEGATIVE for changes in mood or affect    EXAM:   BP (!) 86/58 (BP Location: Left arm, Cuff Size: Adult Regular)  Pulse 56  Temp 97.8  F (36.6  C) (Tympanic)  Ht 5' 5\" (1.651 m)  Wt 173 lb (78.5 kg)  LMP 07/04/2018  SpO2 97%  BMI 28.79 kg/m2    GENERAL APPEARANCE: healthy, alert and no distress     EYES: EOMI, PERRL     HENT: ear canals and TM's normal and nose and mouth without ulcers or lesions     NECK: no adenopathy, no asymmetry, masses, or scars and thyroid normal to palpation     RESP: lungs clear to auscultation - no rales, rhonchi or wheezes     CV: regular rates and rhythm, normal S1 S2, no S3 or S4 and no murmur, click or rub     ABDOMEN:  soft, nontender, no HSM or masses and bowel sounds normal     MS: extremities normal- no gross deformities noted, no evidence of inflammation in joints, FROM in all extremities.     SKIN: no suspicious lesions or rashes     PSYCH: mentation appears normal. and affect normal/bright     LYMPHATICS: No cervical adenopathy    DIAGNOSTICS:   EKG: Not indicated due to non-vascular surgery and low risk of event (age <65 and without cardiac risk factors)    Recent Labs   Lab Test  06/06/18   1518  05/15/18   1636   11/18/13   0820   HGB  13.9  13.1   < >  14.3   PLT  298  256   < >  227   NA   --   136   --   134   POTASSIUM   --   3.6   --   4.1   CR   --   0.94   --   0.61    < > = values in this interval not displayed.        IMPRESSION:   Reason for surgery/procedure: Abnormal uterine bleeding   Diagnosis/reason for consult: Pre op evaluation     The proposed surgical procedure is considered LOW risk.    REVISED CARDIAC RISK INDEX  The patient has the following serious cardiovascular risks for perioperative complications such as (MI, PE, VFib and 3  AV Block):  No serious cardiac risks  INTERPRETATION: 0 risks: Class I (very low risk - 0.4% " complication rate)    The patient has the following additional risks for perioperative complications:  No identified additional risks      ICD-10-CM    1. Preop general physical exam Z01.818    2. Abnormal uterine bleeding N93.9    3. Hypotension, unspecified hypotension type I95.9        RECOMMENDATIONS:       Cardiovascular Risk  Performs 4 METs exercise without symptoms (Light housework (dusting, washing dishes), Climb a flight of stairs and Walk on level ground at 15 minutes per mile (4 miles/hour)) .       Pulmonary Risk  Incentive spirometry post op  Respiratory Therapy (Respiratory Care IP Consult)  post op  NG tube decompression if abdominal distension or significant vomiting       Hypotension  Patient may need IV fluids prior to surgery       --Patient is to take all scheduled medications on the day of surgery EXCEPT for modifications listed below.    Asked to avoid aspirin and NSAIDs 5-7 days before surgery     APPROVAL GIVEN to proceed with proposed procedure, without further diagnostic evaluation       Signed Electronically by: Hoang Andrade MD    Copy of this evaluation report is provided to requesting physician.    Edu Preop Guidelines    Revised Cardiac Risk Index

## 2018-07-18 NOTE — OP NOTE
Op Note    Preop Dx: 1. menorrhagia    Postop DX: 1. menorrhagia    Procedure: Dilation and curettage hysteroscopy with endometrial ablation    Surgeon: Lucy Tate M.D.     Assist: n/a    Anesthesia: local MAC    FRA Score: 2    EBL: minimal    IVF: see anesthesia records    UOP: see anesthesia records    Complications: none    Findings:  normal endometrial cavity, bilateral tubal ostia visualized    Indications: Amarilis is a 33 year old   with a history of heavy menses.  She tried progesterone without success.  Her bleeding came back heavier, and the patient did not like the side effects.  She desires surgical intervention.  Decision was made to proceed with dilation and curettage hysteroscopy with ablation.  Risks and benefits were discussed including risks of bleeding, infection and damage to the uterus.  Patient desired to proceed.     Procedure:  Patient was brought to the operating room and monitored anesthesia care was provided without difficulty.  She was prepped and draped in the usual sterile fashion in the dorsal lithotomy position.  A bivalve speculum was placed in the vagina.  The anterior lip of the cervix was infiltrated with 2 cc of 2% lidocaine and grasped with a single-toothed tenaculum.  A paracervical block was performed using 4 cc of 2% lidocaine at the 4 and 7 o'clock positions.  The hysteroscope was introduced and the above findings noted.  The sounding length was 8.5 cm and the cervical length was 3.5 cm.  The cervix was gradually dilated to 7 mm.  A sharp curettage was performed until a gritty texture was noted, and the specimen was sent to pathology.  The [place] endometrial ablation device was inserted and deployed according to 's instructions for its appointed duration.  The device was removed and reinspection of the uterus with the hysteroscope revealed adequate ablation.  All instruments were removed from the vagina and excellent hemostasis was noted.       Patient tolerated the procedure well.  Sponge, lap and needle counts are correct.  I was present for the entire procedure.  The patient was taken to her recovery room in stable condition.

## 2018-07-18 NOTE — BRIEF OP NOTE
Brief Op Note    Preop Dx: menorrhagia  Postop DX: same  Procedure: D&C hysteroscopy ablation  Surgeon: Lucy Tate M.D.   Assist: n/a  Anesthesia: local/MAC  FRA Score: 2  EBL: minimal  IVF: see anesthesia records  UOP: see anesthesia records  Complications: none  Findings: normal endometrial cavity, bilateral ostia visualized  Disposition: stable

## 2018-07-18 NOTE — ANESTHESIA CARE TRANSFER NOTE
Patient: Amarilis Espinal    Procedure(s):  Dilation and curettage, Hysteroscopy, endometrial ablation. ([place]) - Wound Class: II-Clean Contaminated    Diagnosis: Heavy vaginal bleeding  Diagnosis Additional Information: No value filed.    Anesthesia Type:   MAC     Note:  Airway :Face Mask  Patient transferred to:Phase II  Comments: Patient's VSS. Spontaneous respirations. Patient awake and oriented. IV patent. Report to RN.Handoff Report: Identifed the Patient, Identified the Reponsible Provider, Reviewed the pertinent medical history, Discussed the surgical course, Reviewed Intra-OP anesthesia mangement and issues during anesthesia, Set expectations for post-procedure period and Allowed opportunity for questions and acknowledgement of understanding      Vitals: (Last set prior to Anesthesia Care Transfer)    CRNA VITALS  7/18/2018 1014 - 7/18/2018 1044      7/18/2018             Pulse: 53    SpO2: 100 %                Electronically Signed By: JINA Wetzel CRNA  July 18, 2018  10:44 AM

## 2018-07-18 NOTE — DISCHARGE INSTRUCTIONS
Anesthesia   Same Day Surgery Discharge Instructions  Special Precautions After Surgery - Adult    1. It is not unusual to feel lightheaded or faint, up to 24 hours after surgery or while taking pain medication.  If you have these symptoms; sit for a few minutes before standing and have someone assist you when getting up.  2. You should rest and relax for the next 24 hours and must have someone stay with you for at least 24 hours after your discharge.  3. DO NOT DRIVE any vehicle or operate mechanical equipment for 24 hours following the end of your surgery.  DO NOT DRIVE while taking narcotic pain medications that have been prescribed by your physician.  If you had a limb operated on, you must be able to use it fully to drive.  4. DO NOT drink alcoholic beverages for 24 hours following surgery or while taking prescription pain medication.  5. Drink clear liquids (apple juice, ginger ale, broth, 7-Up, etc.).  Progress to your regular diet as you feel able.  6. Any questions call your physician and do not make important decisions for 24 hours.    __________________________________________________________________________________________________________________________________  IMPORTANT NUMBERS:    [facility] Main Number:  [phone], [phone]  Pharmacy:  [phone]      [facility] Clinic:  [phone] call with any questions/concerns and for follow up   Urgent Care:  [phone]  Emergency Room:  [phone]    Call AFTER HOURS with any questions/concerns

## 2018-07-18 NOTE — IP AVS SNAPSHOT
[facility] PreOP/Phase II    [address] [address]    Phone:  [phone]    Fax:  [phone]                                       After Visit Summary   7/18/2018    Amarilis Espinal    MRN: [id number]           After Visit Summary Signature Page     I have received my discharge instructions, and my questions have been answered. I have discussed any challenges I see with this plan with the nurse or doctor.    ..........................................................................................................................................  Patient/Patient Representative Signature      ..........................................................................................................................................  Patient Representative Print Name and Relationship to Patient    ..................................................               ................................................  Date                                            Time    ..........................................................................................................................................  Reviewed by Signature/Title    ...................................................              ..............................................  Date                                                            Time

## 2018-07-23 LAB — COPATH REPORT: NORMAL

## 2018-09-13 ENCOUNTER — HOSPITAL ENCOUNTER (EMERGENCY)
Facility: CLINIC | Age: 33
Discharge: HOME | End: 2018-09-13
Attending: NURSE PRACTITIONER | Admitting: NURSE PRACTITIONER
Payer: COMMERCIAL

## 2018-09-13 ENCOUNTER — VIRTUAL VISIT (OUTPATIENT)
Dept: FAMILY MEDICINE | Facility: OTHER | Age: 33
End: 2018-09-13

## 2018-09-13 VITALS
HEART RATE: 56 BPM | TEMPERATURE: 98.1 F | SYSTOLIC BLOOD PRESSURE: 122 MMHG | DIASTOLIC BLOOD PRESSURE: 78 MMHG | OXYGEN SATURATION: 98 %

## 2018-09-13 DIAGNOSIS — J02.0 ACUTE STREPTOCOCCAL PHARYNGITIS: ICD-10-CM

## 2018-09-13 DIAGNOSIS — J01.00 ACUTE NON-RECURRENT MAXILLARY SINUSITIS: ICD-10-CM

## 2018-09-13 LAB
INTERNAL QC OK POCT: YES
S PYO AG THROAT QL IA.RAPID: POSITIVE

## 2018-09-13 PROCEDURE — 99213 OFFICE O/P EST LOW 20 MIN: CPT | Mod: Z6 | Performed by: NURSE PRACTITIONER

## 2018-09-13 PROCEDURE — 87880 STREP A ASSAY W/OPTIC: CPT | Performed by: NURSE PRACTITIONER

## 2018-09-13 PROCEDURE — G0463 HOSPITAL OUTPT CLINIC VISIT: HCPCS | Performed by: NURSE PRACTITIONER

## 2018-09-13 ASSESSMENT — ENCOUNTER SYMPTOMS
HEADACHES: 1
WEAKNESS: 0
DIZZINESS: 0
VOMITING: 0
SORE THROAT: 1
FEVER: 1
RHINORRHEA: 1
MYALGIAS: 1
SINUS PRESSURE: 1
COUGH: 1
SHORTNESS OF BREATH: 0
FATIGUE: 1
CHILLS: 1
NAUSEA: 0

## 2018-09-13 NOTE — PROGRESS NOTES
"Date:   Clinician: Jarrett Ferguson  Clinician NPI: [id number]  Patient: luis Espinal  Patient : 1985  Patient Address: [address] [place] [address]  Patient Phone: [phone]  Visit Protocol: URI  Patient Summary:  luis is a 33 year old ( : 1985 ) female who initiated a Visit for cold, sinus infection, or influenza. When asked the question \"Please sign me up to receive news, health information and promotions from [employer].\", luis responded \"No\".    luis states her symptoms started suddenly 3-6 days ago. After her symptoms started, they improved and then got worse again.   Her symptoms consist of myalgia, facial pain or pressure, chills, a cough, rhinitis, enlarged lymph nodes, malaise, nasal congestion, a sore throat, and a headache.   Symptom details     Nasal secretions: The color of her mucus is yellow and blood-tinged.    Cough: luis coughs every 5-10 minutes and her cough is not more bothersome at night. Phlegm comes into her throat when she coughs. She believes the phlegm causes the cough. The color of the phlegm is blood-tinged, yellow, and white.     Sore throat: luis reports having moderate throat pain (between 4-6 on a 10 point pain scale), has exudate on her tonsils, and can swallow liquids. The lymph nodes in her neck are enlarged. A rash has not appeared on the skin since the sore throat started.     Facial pain or pressure: The facial pain or pressure feels worse when bending over or leaning forward.     Headache: She states the headache is moderate (between 4-6 on a 10 point pain scale).      luis denies having wheezing, fever, dyspnea, ear pain, and teeth pain. She also denies having recent facial or sinus surgery in the past 60 days, taking antibiotic medication for the symptoms, and having a sinus infection within the past year.   Within the past week, luis has been exposed to someone with strep throat. She has not recently been " exposed to someone with influenza. luis has been in close contact with the following high risk individuals: immunocompromised people, children under the age of 5, and pregnant women.   Weight: 160 lbs   luis does not smoke or use smokeless tobacco.   She denies pregnancy and denies breastfeeding. Her last period was over a month ago.   MEDICATIONS: nutritional supplements oral, ALLERGIES: Opioids - Morphine Analogues  Clinician Response:  Dear luis,  Based on the information provided, you have a viral upper respiratory infection, otherwise known as a cold. Symptoms vary from person to person, but can include sneezing, coughing, a runny nose, sore throat, and headache and range from mild to severe.  Unfortunately, there are no medications that can cure a cold, so treatment is focused on controlling symptoms as much as possible. Most people gradually feel better until symptoms are gone in 1-2 weeks.  Medication information  Because you have a viral infection, antibiotics will not help you get better. Treating a viral infection with antibiotics could actually make you feel worse.  Self care  The following tips will keep you as comfortable as possible while you recover:     Rest    Drink plenty of water and other liquids    Take a hot shower to loosen congestion    Use throat lozenges    Gargle with warm salt water (1/4 teaspoon of salt per 8 ounce glass of water)    Suck on frozen items such as popsicles or ice cubes    Drink hot tea with lemon and honey    Take a spoonful of honey to reduce your cough     When to seek care  Please be seen in a clinic or urgent care if new symptoms develop, or symptoms become worse.  Call 911 or go to the emergency room if you feel that your throat is closing off, you suddenly develop a rash, you are unable to swallow fluids, you are drooling, or you are having difficulty breathing.   Diagnosis: Viral URI  Diagnosis ICD: J06.9  Additional Clinician Notes: Symptomatic cares    Addendum created: September 13 12:06:28, 2018 created by: Jarrett Ferguson body: Rex strep throat ticket   Addendum created: September 13 12:05:40, 2018 created by: Jarrett Ferguson body: Yes, please have pt do a zip ticket

## 2018-09-13 NOTE — LETTER
[facility] EMERGENCY DEPARTMENT  [address] [address]  [phone]          September 13, 2018    RE:  Amarilis PEREZ Teddy                                                                                                                                                       [address] [facility] [address]            To whom it may concern:    Amarilis PEREZ Camerondeysi was under my professional care for illness. Please excuse her from work.         Sincerely,          JINA Foster CNP

## 2018-09-13 NOTE — ED PROVIDER NOTES
History     Chief Complaint   Patient presents with     Pharyngitis     HPI  Amarilis Espinal is a 33 year old female who presents to urgent care for evaluation of sinus congestion, cough, sinus pressure, and sore throat.  Symptoms started 8 days ago.  Accompanied by low-grade fevers.  Feeling fatigued.  Patient was out of town on travel last week.  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    Problem List:    Patient Active Problem List    Diagnosis Date Noted     AR (allergic rhinitis) 08/01/2013     Priority: Medium     Lactose intolerance 08/01/2013     Priority: Medium     Lumbago 06/08/2011     Priority: Medium     Dysmenorrhea 10/26/2010     Priority: Medium     Migraine headache 07/12/2010     Priority: Medium     Blurred vision, vertigo prior to HA's.       Excessive or frequent menstruation 07/12/2010     Priority: Medium        Past Medical History:    Past Medical History:   Diagnosis Date     Arthritis      Chickenpox      Esophageal reflux      Headache 7/12/2010     Intestinal disaccharidase deficiencies and disaccharide malabsorption      Migraines      Other specified anemias      PONV (postoperative nausea and vomiting)        Past Surgical History:    Past Surgical History:   Procedure Laterality Date     ARTHROSCOPY KNEE RT/LT  1/2005    Left knee surgery     AS HYSTEROS W PERMANENT FALLOPAIN IMPLANT  2014    [facility]     C ORAL SURGERY PROCEDURE  age 18    [place] teeth     DILATION AND CURETTAGE SUCTION  12/30/2010    DILATION AND CURETTAGE SUCTION performed by HENRIQUE TATE at [facility]     DILATION AND CURETTAGE, HYSTEROSCOPY, ABLATE ENDOMETRIUM, COMBINED N/A 7/18/2018    Procedure: COMBINED DILATION AND CURETTAGE, HYSTEROSCOPY, ABLATE ENDOMETRIUM;  Dilation and curettage, Hysteroscopy, endometrial ablation. ([place]);  Surgeon: Henrique Tate MD;  Location: [facility]     GYN SURGERY  2014    [facility]     TONSILLECTOMY Bilateral 7/25/2016    Procedure: TONSILLECTOMY;  Surgeon:  Bradley Medrano MD;  Location: [facility]       Family History:    Family History   Problem Relation Age of Onset     Breast Cancer Maternal Grandmother 55     Thyroid Disease Maternal Grandmother      Arthritis Maternal Grandmother      Alzheimer Disease Maternal Grandmother      Gynecology Maternal Grandmother      endometriosis     Osteoporosis Maternal Grandmother      Diabetes Paternal Grandfather      Depression Paternal Grandfather      Hypertension Mother      Gynecology Mother      endometriosis     HEART DISEASE Maternal Grandfather      MI     Cancer Maternal Grandfather      leukemia     Cardiovascular Maternal Grandfather      Hypertension Maternal Grandfather      Gynecology Paternal Grandmother      endometriosis     Eye Disorder Paternal Grandmother      Obesity Father      Cancer - colorectal No family hx of        Social History:  Marital Status:   [2]  Social History   Substance Use Topics     Smoking status: Never Smoker     Smokeless tobacco: Never Used     Alcohol use Yes      Comment: rare        Medications:      amoxicillin-clavulanate (AUGMENTIN) 875-125 MG per tablet   acetaminophen (TYLENOL) 325 MG tablet   Cholecalciferol (VITAMIN D3 PO)   EPINEPHrine (EPIPEN) 0.3 MG/0.3ML injection   EPINEPHrine (EPIPEN/ADRENACLICK/OR ANY BX GENERIC EQUIV) 0.3 MG/0.3ML injection 2-pack   Ferrous Sulfate (IRON SUPPLEMENT PO)   ibuprofen (ADVIL/MOTRIN) 600 MG tablet   Magnesium Gluconate (MAGNESIUM 27 PO)   multivitamin, therapeutic (THERA-VIT) TABS tablet   Omega-3 Fatty Acids (OMEGA-3 FISH OIL PO)   oxyCODONE IR (ROXICODONE) 5 MG tablet         Review of Systems   Constitutional: Positive for chills, fatigue and fever.   HENT: Positive for congestion, ear pain, postnasal drip, rhinorrhea, sinus pressure and sore throat.    Respiratory: Positive for cough. Negative for shortness of breath.    Cardiovascular: Negative for chest pain.   Gastrointestinal: Negative for nausea and vomiting.    Musculoskeletal: Positive for myalgias.   Neurological: Positive for headaches. Negative for dizziness and weakness.       Physical Exam   BP: 122/78  Pulse: 56  Temp: 98.1  F (36.7  C)  SpO2: 98 %      Physical Exam  GENERAL APPEARANCE: healthy, alert and no distress  EYES: EOMI,  PERRL, conjunctiva clear  HENT: ear canals and TM's normal.Rhinnorhea.  Posterior oropharynx erythema without exudate.  Uvula is midline.  No unilateral peritonsillar swelling. Sinus tenderness.  NECK: supple, nontender, no lymphadenopathy  RESP: lungs clear to auscultation - no rales, rhonchi or wheezes  CV: regular rates and rhythm, normal S1 S2, no murmur noted    ED Course     ED Course     Procedures               Results for orders placed or performed during the hospital encounter of 09/13/18 (from the past 24 hour(s))   Rapid strep group A screen POCT   Result Value Ref Range    Rapid Strep A Screen POSITIVE neg    Internal QC OK Yes        Medications - No data to display    Assessments & Plan (with Medical Decision Making)   RST positive.  Symptoms and exam are more consistent with Acute sinusitis.  Patient will be initiated on antibiotics.  Patient notified contagious for 24 hours after initiating antibiotics. Recommend replacement of toothbrush at that time.  Follow up with PCP if no improvement in three days.  Worrisome reasons to seek care sooner discussed.      I have reviewed the nursing notes.    I have reviewed the findings, diagnosis, plan and need for follow up with the patient.      Discharge Medication List as of 9/13/2018  5:02 PM      START taking these medications    Details   amoxicillin-clavulanate (AUGMENTIN) 875-125 MG per tablet Take 1 tablet by mouth 2 times daily, Disp-20 tablet, R-0, E-Prescribe             Final diagnoses:   Acute non-recurrent maxillary sinusitis   Acute streptococcal pharyngitis       9/13/2018   [facility] EMERGENCY DEPARTMENT     Stefano, JINA Walker CNP  09/13/18  1920

## 2018-09-13 NOTE — ED AVS SNAPSHOT
[facility] Emergency Department    [address] [address]    Phone:  [phone]    Fax:  [phone]                                       Amarilis Espinal   MRN: [id number]    Department:  [facility] Emergency Department   Date of Visit:  9/13/2018           Patient Information     Date Of Birth          1985        Your diagnoses for this visit were:     Acute non-recurrent maxillary sinusitis     Acute streptococcal pharyngitis        You were seen by Mari Agarwal, JINA MCCALLUM.      Follow-up Information     Follow up with Clinic, [facility].    Why:  As needed    Contact information:    [address] [address] [phone]          Discharge Instructions       Discharge Instructions  Sinus Infection    You have acute sinusitis, or an infection of the sinuses. The sinuses are the hollow areas within the facial bones that are connected to the nasal opening. The most common cause of acute sinusitis is a virus infection associated with the common cold. Bacterial sinusitis occurs much less commonly, usually as a complication of viral sinusitis. Experts say that most sinusitis is caused by a virus within the first 7-10 days of illness. Antibiotics do nothing to help with virus infections, so most people do not need antibiotics for acute sinusitis.     Return to the Emergency Department if:    Your vision changes.    You are confused or have difficulty thinking clearly.    You have swelling around your eye.    You develop a severe headache or neck stiffness.    You have a fever over 101 degrees.    Your symptoms get worse and you are unable to see your primary doctor.    Follow-up with your doctor:     See your primary doctor in one week if not improving.    Treatment:    Pain relief -- Non-prescription pain medications, such as Tylenol  (acetaminophen) or Motrin  or Advil  (ibuprofen) are recommended for pain.  Do not use a medicine that you are  "allergic to, or if your doctor has told you not to use it.       Nasal irrigation -- Flushing the nose and sinuses with a saline solution several times per day can help to decrease pain caused by congestion.    Nasal decongestants -- Nasal decongestant sprays, including Afrin  (oxymetazoline) and Wallace-Synephrine  (phenylephrine) can be used to temporarily treat congestion. However, these sprays should not be used for more than two to three days due to the risk of rebound congestion (when the nose is congested constantly unless the medication is used repeatedly).    Nasal glucocorticoids -- These are prescription steroids delivered by a nasal spray that can help to reduce swelling inside the nose, usually within two to three days. These drugs have few side effects and dramatically relieve symptoms in most people.  If you use these in conjunction with Afrin  you will need to use at least 15 minutes prior to the nasal decongestant.      Do I need an antibiotic? -- Sometimes, but not always, antibiotics are used along with the above treatments.    Antibiotic Warning:     If you have been placed on antibiotics - watch for signs of allergic reaction.  These include rash, lip swelling, difficulty breathing, wheezing, and dizziness.  If you develop any of these symptoms, stop the antibiotic immediately and go to an Emergency Department or Urgent Care for evaluation.    Probiotics: If you have been given an antibiotic, you may want to also take a probiotic pill or eat yogurt with live cultures. Probiotics have \"good bacteria\" to help your intestines stay healthy. Studies have shown that probiotics help prevent diarrhea and other intestine problems (including C. diff infection) when you take antibiotics. You can buy these without a prescription in the pharmacy section of the store.   If you were given a prescription for medicine here today, be sure to read all of the information (including the package insert) that comes with " your prescription.  This will include important information about the medicine, its side effects, and any warnings that you need to know about.  The pharmacist who fills the prescription can provide more information and answer questions you may have about the medicine.  If you have questions or concerns that the pharmacist cannot address, please call or return to the Emergency Department.       24 Hour Appointment Hotline       To make an appointment at any [facility] call [phone] ([phone]). If you don't have a family doctor or clinic, we will help you find one. [facility] are conveniently located to serve the needs of you and your family.             Review of your medicines      START taking        Dose / Directions Last dose taken    amoxicillin-clavulanate 875-125 MG per tablet   Commonly known as:  AUGMENTIN   Dose:  1 tablet   Quantity:  20 tablet        Take 1 tablet by mouth 2 times daily   Refills:  0          Our records show that you are taking the medicines listed below. If these are incorrect, please call your family doctor or clinic.        Dose / Directions Last dose taken    acetaminophen 325 MG tablet   Commonly known as:  TYLENOL   Dose:  650 mg   Quantity:  100 tablet        Take 2 tablets (650 mg) by mouth every 4 hours as needed for other (mild pain)   Refills:  0        * EPINEPHrine 0.3 MG/0.3ML injection 2-pack   Commonly known as:  EPIPEN/ADRENACLICK/or ANY BX GENERIC EQUIV   Dose:  0.3 mg   Quantity:  1 each        Inject 0.3 mLs (0.3 mg) into the muscle once as needed for anaphylaxis   Refills:  1        * EPINEPHrine 0.3 MG/0.3ML injection 2-pack   Commonly known as:  EPIPEN/ADRENACLICK/or ANY BX GENERIC EQUIV   Dose:  0.3 mg   Quantity:  0.6 mL        Inject 0.3 mLs (0.3 mg) into the muscle as needed for anaphylaxis (severe allergic reaction)   Refills:  1        ibuprofen 600 MG tablet   Commonly known as:  ADVIL/MOTRIN   Dose:  600 mg   Quantity:  30 tablet         Take 1 tablet (600 mg) by mouth every 6 hours as needed for pain (mild)   Refills:  0        IRON SUPPLEMENT PO   Dose:  325 mg        Take 325 mg by mouth daily.   Refills:  0        MAGNESIUM 27 PO   Dose:  1250 mcg        Take 1,250 mcg by mouth   Refills:  0        multivitamin, therapeutic Tabs tablet   Dose:  1 tablet        Take 1 tablet by mouth daily   Refills:  0        OMEGA-3 FISH OIL PO   Dose:  1 g        Take 1 g by mouth daily.   Refills:  0        oxyCODONE IR 5 MG tablet   Commonly known as:  ROXICODONE   Dose:  5-10 mg   Quantity:  15 tablet        Take 1-2 tablets (5-10 mg) by mouth every 3 hours as needed for pain or other (Moderate to Severe)   Refills:  0        VITAMIN D3 PO   Dose:  1000 Units        Take 1,000 Units by mouth daily   Refills:  0        * Notice:  This list has 2 medication(s) that are the same as other medications prescribed for you. Read the directions carefully, and ask your doctor or other care provider to review them with you.            Prescriptions were sent or printed at these locations (1 Prescription)                   [facility] [address]   [address] [place] [address]    Telephone:  [phone]   Fax:  [phone]   Hours:                  E-Prescribed (1 of 1)         amoxicillin-clavulanate (AUGMENTIN) 875-125 MG per tablet                Procedures and tests performed during your visit     Rapid strep group A screen POCT      Orders Needing Specimen Collection     None      Pending Results     No orders found from 9/11/2018 to 9/14/2018.            Pending Culture Results     No orders found from 9/11/2018 to 9/14/2018.            Pending Results Instructions     If you had any lab results that were not finalized at the time of your Discharge, you can call the ED Lab Result RN at [phone]. You will be contacted by this team for any positive Lab results or changes in treatment. The nurses are available 7  days a week from 10A to 6:30P.  You can leave a message 24 hours per day and they will return your call.        Test Results From Your Hospital Stay        9/13/2018  4:52 PM      Component Results     Component Value Ref Range & Units Status    Rapid Strep A Screen POSITIVE neg Final    Internal QC OK Yes  Final                Thank you for choosing [facility]       Thank you for choosing [facility] for your care. Our goal is always to provide you with excellent care. Hearing back from our patients is one way we can continue to improve our services. Please take a few minutes to complete the written survey that you may receive in the mail after you visit with us. Thank you!        [employer] Information     [employer] gives you secure access to your electronic health record. If you see a primary care provider, you can also send messages to your care team and make appointments. If you have questions, please call your primary care clinic.  If you do not have a primary care provider, please call [phone] and they will assist you.        Care EveryWhere ID     This is your Care EveryWhere ID. This could be used by other organizations to access your [facility] medical records  [id number]        Equal Access to Services     EBER CLARK : Hadii adriano Yee, walayla luaylin, qaybcharlie kaaladam edouard, shonda lau . So [facility] [phone].    ATENCIÓN: Si habla español, tiene a groves disposición servicios gratuitos de asistencia lingüística. Llame al [phone].    We comply with applicable federal civil rights laws and [place] laws. We do not discriminate on the basis of race, color, national origin, age, disability, sex, sexual orientation, or gender identity.            After Visit Summary       This is your record. Keep this with you and show to your community pharmacist(s) and doctor(s) at your next visit.

## 2018-09-13 NOTE — ED AVS SNAPSHOT
[facility] Emergency Department    [address] [address]    Phone:  [phone]    Fax:  [phone]                                       Amarilis Espinal   MRN: [id number]    Department:  [facility] Emergency Department   Date of Visit:  9/13/2018           After Visit Summary Signature Page     I have received my discharge instructions, and my questions have been answered. I have discussed any challenges I see with this plan with the nurse or doctor.    ..........................................................................................................................................  Patient/Patient Representative Signature      ..........................................................................................................................................  Patient Representative Print Name and Relationship to Patient    ..................................................               ................................................  Date                                   Time    ..........................................................................................................................................  Reviewed by Signature/Title    ...................................................              ..............................................  Date                                               Time          22EPIC Rev 08/18

## 2019-10-13 ENCOUNTER — HOSPITAL ENCOUNTER (EMERGENCY)
Facility: CLINIC | Age: 34
Discharge: HOME | End: 2019-10-13
Attending: PHYSICIAN ASSISTANT | Admitting: PHYSICIAN ASSISTANT
Payer: COMMERCIAL

## 2019-10-13 VITALS
BODY MASS INDEX: 28.29 KG/M2 | WEIGHT: 170 LBS | DIASTOLIC BLOOD PRESSURE: 64 MMHG | TEMPERATURE: 98.3 F | OXYGEN SATURATION: 98 % | SYSTOLIC BLOOD PRESSURE: 107 MMHG | RESPIRATION RATE: 18 BRPM | HEART RATE: 51 BPM

## 2019-10-13 DIAGNOSIS — R07.0 THROAT PAIN: ICD-10-CM

## 2019-10-13 LAB
INTERNAL QC OK POCT: YES
S PYO AG THROAT QL IA.RAPID: NEGATIVE

## 2019-10-13 PROCEDURE — 87081 CULTURE SCREEN ONLY: CPT | Performed by: PHYSICIAN ASSISTANT

## 2019-10-13 PROCEDURE — 87880 STREP A ASSAY W/OPTIC: CPT | Performed by: PHYSICIAN ASSISTANT

## 2019-10-13 PROCEDURE — G0463 HOSPITAL OUTPT CLINIC VISIT: HCPCS | Performed by: PHYSICIAN ASSISTANT

## 2019-10-13 PROCEDURE — 99213 OFFICE O/P EST LOW 20 MIN: CPT | Mod: Z6 | Performed by: PHYSICIAN ASSISTANT

## 2019-10-13 ASSESSMENT — ENCOUNTER SYMPTOMS
FEVER: 0
RESPIRATORY NEGATIVE: 1
EYES NEGATIVE: 1
TROUBLE SWALLOWING: 0
NEUROLOGICAL NEGATIVE: 1
GASTROINTESTINAL NEGATIVE: 1
VOICE CHANGE: 0
RHINORRHEA: 1
CARDIOVASCULAR NEGATIVE: 1
SORE THROAT: 1
MUSCULOSKELETAL NEGATIVE: 1

## 2019-10-13 NOTE — ED PROVIDER NOTES
History     Chief Complaint   Patient presents with     Pharyngitis     HPI    Amarilis Espinal  is a 34 year old female who is here today because of: Sore Throat.  The patient has had symptoms of sore throat and nasal congestion/runny nose.   Onset of symptoms was 2 days ago. Course of illness is same.  Patient admits to exposure to illness at home or work/school. Daughter with similar symptoms   Patient denies fever, cough, earache, nausea, vomiting, diarrhea, headache and abdominal pain or rash.   Treatment measures tried include acetaminophen, ibuprofen.    Patient up to date with vaccines and would like strep throat ruled out.     Problem list, Medication list, Allergies, and Medical/Social/Surgical histories reviewed in [facility] and updated as appropriate.    Allergies:  Allergies   Allergen Reactions     Adhesive Tape Dermatitis     Beef-Derived Products GI Disturbance     Bees Hives     Swelling       Lactose Diarrhea     Morphine Nausea and Vomiting     Soy Allergy GI Disturbance     IBS        Problem List:    Patient Active Problem List    Diagnosis Date Noted     AR (allergic rhinitis) 08/01/2013     Priority: Medium     Lactose intolerance 08/01/2013     Priority: Medium     Lumbago 06/08/2011     Priority: Medium     Dysmenorrhea 10/26/2010     Priority: Medium     Migraine headache 07/12/2010     Priority: Medium     Blurred vision, vertigo prior to HA's.       Excessive or frequent menstruation 07/12/2010     Priority: Medium        Past Medical History:    Past Medical History:   Diagnosis Date     Arthritis      Chickenpox      Esophageal reflux      Headache 7/12/2010     Intestinal disaccharidase deficiencies and disaccharide malabsorption      Migraines      Other specified anemias      PONV (postoperative nausea and vomiting)        Past Surgical History:    Past Surgical History:   Procedure Laterality Date     ARTHROSCOPY KNEE RT/LT  1/2005    Left knee surgery     AS HYSTEROS W PERMANENT  FALLOPAIN IMPLANT  2014    [facility]     C ORAL SURGERY PROCEDURE  age 18    [place] teeth     DILATION AND CURETTAGE SUCTION  12/30/2010    DILATION AND CURETTAGE SUCTION performed by HENRIQUE TATE at [facility]     DILATION AND CURETTAGE, HYSTEROSCOPY, ABLATE ENDOMETRIUM, COMBINED N/A 7/18/2018    Procedure: COMBINED DILATION AND CURETTAGE, HYSTEROSCOPY, ABLATE ENDOMETRIUM;  Dilation and curettage, Hysteroscopy, endometrial ablation. ([place]);  Surgeon: Henrique Tate MD;  Location: [facility] OR     GYN SURGERY  2014    [facility]     TONSILLECTOMY Bilateral 7/25/2016    Procedure: TONSILLECTOMY;  Surgeon: Bradley Medrano MD;  Location:  OR       Family History:    Family History   Problem Relation Age of Onset     Breast Cancer Maternal Grandmother 55     Thyroid Disease Maternal Grandmother      Arthritis Maternal Grandmother      Alzheimer Disease Maternal Grandmother      Gynecology Maternal Grandmother         endometriosis     Osteoporosis Maternal Grandmother      Diabetes Paternal Grandfather      Depression Paternal Grandfather      Hypertension Mother      Gynecology Mother         endometriosis     Heart Disease Maternal Grandfather         MI     Cancer Maternal Grandfather         leukemia     Cardiovascular Maternal Grandfather      Hypertension Maternal Grandfather      Gynecology Paternal Grandmother         endometriosis     Eye Disorder Paternal Grandmother      Obesity Father      Cancer - colorectal No family hx of        Social History:  Marital Status:   [2]  Social History     Tobacco Use     Smoking status: Never Smoker     Smokeless tobacco: Never Used   Substance Use Topics     Alcohol use: Yes     Comment: rare     Drug use: No        Medications:    acetaminophen (TYLENOL) 325 MG tablet  Cholecalciferol (VITAMIN D3 PO)  EPINEPHrine (EPIPEN) 0.3 MG/0.3ML injection  EPINEPHrine (EPIPEN/ADRENACLICK/OR ANY BX GENERIC EQUIV) 0.3 MG/0.3ML injection 2-pack  Ferrous Sulfate (IRON SUPPLEMENT  PO)  ibuprofen (ADVIL/MOTRIN) 600 MG tablet  Magnesium Gluconate (MAGNESIUM 27 PO)  multivitamin, therapeutic (THERA-VIT) TABS tablet  Omega-3 Fatty Acids (OMEGA-3 FISH OIL PO)          Review of Systems   Constitutional: Negative for fever.   HENT: Positive for postnasal drip, rhinorrhea and sore throat. Negative for trouble swallowing and voice change.    Eyes: Negative.    Respiratory: Negative.    Cardiovascular: Negative.    Gastrointestinal: Negative.    Musculoskeletal: Negative.    Skin: Negative.    Neurological: Negative.    All other systems reviewed and are negative.      Physical Exam   BP: 107/64  Pulse: 51  Temp: 98.3  F (36.8  C)  Resp: 18  Weight: 77.1 kg (170 lb)  SpO2: 98 %      Physical Exam     /64   Pulse 51   Temp 98.3  F (36.8  C) (Temporal)   Resp 18   Wt 77.1 kg (170 lb)   SpO2 98%   BMI 28.29 kg/m    General: healthy, alert with no acute distress, and non toxic in appearance  Eyes - conjunctivae clear.  Ears - External ears normal. Canals clear. TM's normal.  Nose/Sinuses - Nares normal.Mucosa normal. No drainage or sinus tenderness.  Oropharynx - Lips, mucosa, and tongue normal. Positive findings: minimal oropharyngeal erythema with few posterior vesicles noted. No tonsillar hypertrophy or exudates present.  Uvula midline.  No dysphonia or dysphasia noted.  No trismus.  Neck - Neck supple; Positive findings: moderate anterior cervical nodes, no meningeal signs.  Lungs - Lungs clear; no wheezing or rales.  Heart - regular rate and rhythm. No murmurs, rub.  Abdomen: Abdomen soft, non-tender. BS normal. No masses, organomegaly  SKIN: no suspicious lesions or rashes    Labs:  Rapid Strep test is negative; await throat culture results.  Results for orders placed or performed during the hospital encounter of 10/13/19 (from the past 24 hour(s))   Rapid strep group A screen POCT   Result Value Ref Range    Rapid Strep A Screen negative neg    Internal QC OK Yes            ED Course         Procedures              Critical Care time:  none               Results for orders placed or performed during the hospital encounter of 10/13/19 (from the past 24 hour(s))   Rapid strep group A screen POCT   Result Value Ref Range    Rapid Strep A Screen negative neg    Internal QC OK Yes        Medications - No data to display    Assessments & Plan (with Medical Decision Making)     I have reviewed the nursing notes.    I have reviewed the findings, diagnosis, plan and need for follow up with the patient.   34-year-old female presents the urgent care with 2-day history of sore throat runny nose congestion.  See exam findings above.  Daughter with similar symptoms and patient wants strep test ruled out.  Rapid strep obtained in office today was negative.  Throat culture currently pending.  Discussed with mother that at this time symptoms appear to be more viral in origin especially since daughter has hand-foot-and-mouth.  No antibiotics indicated at this time.  Patient increase fluids, rest, Tylenol and ibuprofen over-the-counter as needed for pain.  Patient return sooner if symptoms worsen or change these were discussed with patient given discharge paperwork.  No concerns for peritonsillar abscess or Hunter's angina at this time.  Patient discharged in stable condition.    New Prescriptions    No medications on file       Final diagnoses:   Throat pain       10/13/2019   [facility] EMERGENCY DEPARTMENT     Tran Chacon PA-C  10/13/19 [id number]

## 2019-10-13 NOTE — DISCHARGE INSTRUCTIONS
No antibiotics indicated at this time.  Throat culture currently pending.    Patient advised to call for any lab results (if obtained during visit) within 2-3 days.     Symptomatic treatment with fluids, rest, salt water gargles, and cool humidifier.  May use acetaminophen, ibuprofen as needed.     Return to care if any worsening symptoms or if not improving ([place] may need to be ruled out if symptoms fail to improve).    Patient to go to Emergency Room if drooling, change in voice, difficulty swallowing or talking, or persistent fevers occur.      Patient voiced understanding of instructions given.

## 2019-10-14 NOTE — RESULT ENCOUNTER NOTE
Preliminary Beta strep group A r/o culture is PENDING and/or NEGATIVE at this time.   No changes in treatment per [place] Strep protocol.

## 2019-10-15 LAB
BACTERIA SPEC CULT: NORMAL
Lab: NORMAL
SPECIMEN SOURCE: NORMAL

## 2019-10-15 NOTE — RESULT ENCOUNTER NOTE
Final Beta strep group A r/o culture is NEGATIVE for Group A streptococcus.    No treatment or change in treatment per [place] Strep protocol.

## 2019-11-07 ENCOUNTER — HEALTH MAINTENANCE LETTER (OUTPATIENT)
Age: 34
End: 2019-11-07

## 2019-12-12 ENCOUNTER — OFFICE VISIT (OUTPATIENT)
Dept: FAMILY MEDICINE | Facility: CLINIC | Age: 34
End: 2019-12-12
Payer: COMMERCIAL

## 2019-12-12 VITALS
OXYGEN SATURATION: 98 % | RESPIRATION RATE: 10 BRPM | SYSTOLIC BLOOD PRESSURE: 98 MMHG | BODY MASS INDEX: 30.02 KG/M2 | TEMPERATURE: 96.2 F | HEIGHT: 65 IN | DIASTOLIC BLOOD PRESSURE: 70 MMHG | HEART RATE: 57 BPM | WEIGHT: 180.2 LBS

## 2019-12-12 DIAGNOSIS — M25.50 MULTIPLE JOINT PAIN: Primary | ICD-10-CM

## 2019-12-12 DIAGNOSIS — E66.811 CLASS 1 OBESITY WITHOUT SERIOUS COMORBIDITY WITH BODY MASS INDEX (BMI) OF 30.0 TO 30.9 IN ADULT, UNSPECIFIED OBESITY TYPE: ICD-10-CM

## 2019-12-12 LAB
CRP SERPL-MCNC: <2.9 MG/L (ref 0–8)
ERYTHROCYTE [SEDIMENTATION RATE] IN BLOOD BY WESTERGREN METHOD: 8 MM/H (ref 0–20)
HBA1C MFR BLD: 4.9 % (ref 0–5.6)
TSH SERPL DL<=0.005 MIU/L-ACNC: 1.72 MU/L (ref 0.4–4)

## 2019-12-12 PROCEDURE — 99214 OFFICE O/P EST MOD 30 MIN: CPT | Performed by: NURSE PRACTITIONER

## 2019-12-12 PROCEDURE — 84443 ASSAY THYROID STIM HORMONE: CPT | Performed by: NURSE PRACTITIONER

## 2019-12-12 PROCEDURE — 86140 C-REACTIVE PROTEIN: CPT | Performed by: NURSE PRACTITIONER

## 2019-12-12 PROCEDURE — 83036 HEMOGLOBIN GLYCOSYLATED A1C: CPT | Performed by: NURSE PRACTITIONER

## 2019-12-12 PROCEDURE — 36415 COLL VENOUS BLD VENIPUNCTURE: CPT | Performed by: NURSE PRACTITIONER

## 2019-12-12 PROCEDURE — 85652 RBC SED RATE AUTOMATED: CPT | Performed by: NURSE PRACTITIONER

## 2019-12-12 PROCEDURE — 86431 RHEUMATOID FACTOR QUANT: CPT | Performed by: NURSE PRACTITIONER

## 2019-12-12 ASSESSMENT — MIFFLIN-ST. JEOR: SCORE: 1512.64

## 2019-12-12 NOTE — NURSING NOTE
"Initial BP 98/70 (BP Location: Left arm, Patient Position: Sitting, Cuff Size: Adult Regular)   Pulse 57   Temp 96.2  F (35.7  C) (Tympanic)   Resp 10   Ht 1.642 m (5' 4.65\")   Wt 81.7 kg (180 lb 3.2 oz)   SpO2 98%   BMI 30.32 kg/m   Estimated body mass index is 30.32 kg/m  as calculated from the following:    Height as of this encounter: 1.642 m (5' 4.65\").    Weight as of this encounter: 81.7 kg (180 lb 3.2 oz). .      "

## 2019-12-12 NOTE — PROGRESS NOTES
Subjective     Amarilis Espinal is a 34 year old female who presents to clinic today for the following health issues:    HPI   Concern - Unable to lose weight   Onset: 8 years    Description:   Patient works out follows a strict diet and is still unable to lose weight and keep it off, her lowest weight was 153 pounds December 9th 2018             Therapies Tried and outcome: Dieting being active and working out     Above HPI reviewed. Additionally, also notes that for many years she has had polyarthralgia intermittently, all joints.  No redness over lying any joints. Notes that by the end of most days her hands and feet are swollen.  Not associated with chest pain, shortness of breath, dyspnea on exertion. No calf pain.  This has been ongoing since about the age of 18. Family history of RA. No unusual skin changes or rashes.    Has 3 children, ranging in age from 5 to 13.  Weight has been an issue since first child was born.  Has tried multiple diets including Whole 30, keto. Is working with a  who helps her count macros, etc.  Weight continues to fluctuate.  Works out 5-6 days per week.      No longer has periods. Had ablation following last pregnancy due to dysmenorrhea.  No history of PCOS.      Patient Active Problem List   Diagnosis     Migraine headache     Excessive or frequent menstruation     Dysmenorrhea     Lumbago     AR (allergic rhinitis)     Lactose intolerance     Past Surgical History:   Procedure Laterality Date     ARTHROSCOPY KNEE RT/LT  1/2005    Left knee surgery     AS HYSTEROS W PERMANENT FALLOPAIN IMPLANT  2014    [facility]     C ORAL SURGERY PROCEDURE  age 18    [place] teeth     DILATION AND CURETTAGE SUCTION  12/30/2010    DILATION AND CURETTAGE SUCTION performed by HENRIQUE RODRIGUEZ at [facility]     DILATION AND CURETTAGE, HYSTEROSCOPY, ABLATE ENDOMETRIUM, COMBINED N/A 7/18/2018    Procedure: COMBINED DILATION AND CURETTAGE, HYSTEROSCOPY, ABLATE ENDOMETRIUM;  Dilation and curettage,  Hysteroscopy, endometrial ablation. ([place]);  Surgeon: Lucy Tate MD;  Location: [facility] OR     GYN SURGERY  2014    [facility]     TONSILLECTOMY Bilateral 7/25/2016    Procedure: TONSILLECTOMY;  Surgeon: Bradley Medrano MD;  Location:  OR       Social History     Tobacco Use     Smoking status: Never Smoker     Smokeless tobacco: Never Used   Substance Use Topics     Alcohol use: Yes     Comment: rare     Family History   Problem Relation Age of Onset     Breast Cancer Maternal Grandmother 55     Thyroid Disease Maternal Grandmother      Arthritis Maternal Grandmother      Alzheimer Disease Maternal Grandmother      Gynecology Maternal Grandmother         endometriosis     Osteoporosis Maternal Grandmother      Diabetes Paternal Grandfather      Depression Paternal Grandfather      Hypertension Mother      Gynecology Mother         endometriosis     Heart Disease Maternal Grandfather         MI     Cancer Maternal Grandfather         leukemia     Cardiovascular Maternal Grandfather      Hypertension Maternal Grandfather      Gynecology Paternal Grandmother         endometriosis     Eye Disorder Paternal Grandmother      Obesity Father      Cancer - colorectal No family hx of          Current Outpatient Medications   Medication Sig Dispense Refill     Cholecalciferol (VITAMIN D3 PO) Take 1,000 Units by mouth daily        Ferrous Sulfate (IRON SUPPLEMENT PO) Take 325 mg by mouth daily.       Magnesium Gluconate (MAGNESIUM 27 PO) Take 1,250 mcg by mouth        multivitamin, therapeutic (THERA-VIT) TABS tablet Take 1 tablet by mouth daily       Omega-3 Fatty Acids (OMEGA-3 FISH OIL PO) Take 1 g by mouth daily.       UNABLE TO FIND MEDICATION NAME: Censor       UNABLE TO FIND MEDICATION NAME: Procera       acetaminophen (TYLENOL) 325 MG tablet Take 2 tablets (650 mg) by mouth every 4 hours as needed for other (mild pain) (Patient not taking: Reported on 12/12/2019) 100 tablet 0     EPINEPHrine (EPIPEN) 0.3  "MG/0.3ML injection Inject 0.3 mLs (0.3 mg) into the muscle once as needed for anaphylaxis (Patient not taking: Reported on 5/15/2018) 1 each 1     EPINEPHrine (EPIPEN/ADRENACLICK/OR ANY BX GENERIC EQUIV) 0.3 MG/0.3ML injection 2-pack Inject 0.3 mLs (0.3 mg) into the muscle as needed for anaphylaxis (severe allergic reaction) (Patient not taking: Reported on 6/13/2018) 0.6 mL 1     ibuprofen (ADVIL/MOTRIN) 600 MG tablet Take 1 tablet (600 mg) by mouth every 6 hours as needed for pain (mild) (Patient not taking: Reported on 12/12/2019) 30 tablet 0       Reviewed and updated as needed this visit by Provider         Review of Systems   ROS COMP: Constitutional, HEENT, cardiovascular, pulmonary, GI, , musculoskeletal, neuro, skin, endocrine and psych systems are negative, except as otherwise noted.      Objective    BP 98/70 (BP Location: Left arm, Patient Position: Sitting, Cuff Size: Adult Regular)   Pulse 57   Temp 96.2  F (35.7  C) (Tympanic)   Resp 10   Ht 1.642 m (5' 4.65\")   Wt 81.7 kg (180 lb 3.2 oz)   SpO2 98%   BMI 30.32 kg/m    Body mass index is 30.32 kg/m .  Physical Exam  Vitals signs and nursing note reviewed.   Constitutional:       Appearance: Normal appearance.   HENT:      Head: Normocephalic and atraumatic.      Mouth/Throat:      Mouth: Mucous membranes are moist.   Eyes:      Comments: Non-icteric   Neck:      Musculoskeletal: Neck supple.   Cardiovascular:      Rate and Rhythm: Normal rate and regular rhythm.      Pulses: Normal pulses.      Heart sounds: Normal heart sounds.   Pulmonary:      Effort: Pulmonary effort is normal.      Breath sounds: Normal breath sounds.   Abdominal:      General: Abdomen is flat. Bowel sounds are normal.      Palpations: Abdomen is soft.   Neurological:      General: No focal deficit present.      Mental Status: She is alert and oriented to person, place, and time.   Psychiatric:         Mood and Affect: Mood normal.         Behavior: Behavior normal.    " "     Thought Content: Thought content normal.         Judgment: Judgment normal.          Diagnostic Test Results:  Labs reviewed in Epic  Pending.        Assessment & Plan     1. Multiple joint pain  Will get labs, contact with results.    - Rheumatoid factor  - CRP inflammation  - Erythrocyte sedimentation rate auto  - JUST IN CASE    2. Class 1 obesity without serious comorbidity with body mass index (BMI) of 30.0 to 30.9 in adult, unspecified obesity type  Referred for consult with weight management.  - TSH with free T4 reflex  - Hemoglobin A1c  - BARIATRIC ADULT REFERRAL     BMI:   Estimated body mass index is 30.32 kg/m  as calculated from the following:    Height as of this encounter: 1.642 m (5' 4.65\").    Weight as of this encounter: 81.7 kg (180 lb 3.2 oz).   Weight management plan: Patient referred to endocrine and/or weight management specialty        See Patient Instructions    Return in about 6 months (around 6/12/2020) for worsening or continued symptoms.    JINA Wood [facility]    "

## 2019-12-13 LAB — RHEUMATOID FACT SER NEPH-ACNC: <20 IU/ML (ref 0–20)

## 2019-12-18 ENCOUNTER — MYC MEDICAL ADVICE (OUTPATIENT)
Dept: FAMILY MEDICINE | Facility: CLINIC | Age: 34
End: 2019-12-18

## 2019-12-18 DIAGNOSIS — R63.5 WEIGHT GAIN: Primary | ICD-10-CM

## 2019-12-18 NOTE — TELEPHONE ENCOUNTER
Can we determine if she would like to pursue anything else with me? She has been unable to lose weight despite significant effort and the next step for her from my standpoint would be medication, however sounds like she doesn't want to do that? Her labs were all normal, so outside medication I don't have much to add.  Thanks.

## 2019-12-19 DIAGNOSIS — R63.5 WEIGHT GAIN: Primary | ICD-10-CM

## 2019-12-19 NOTE — TELEPHONE ENCOUNTER
Patient asks provider:  Can we please check my T3?  Thank you,  Amarilis    Pended T3 and T4.    Food sensitivities causing inflammation? Looks like several food allergies.     Provider please review and advise. Thank you.

## 2019-12-19 NOTE — TELEPHONE ENCOUNTER
Orders signed.  She has been on strict avoidance diets as well as multiple other diets in regards to allergies.  Could offer her consult with dietician as well?

## 2020-01-07 DIAGNOSIS — R63.5 WEIGHT GAIN: ICD-10-CM

## 2020-01-07 LAB
T3FREE SERPL-MCNC: 2.5 PG/ML (ref 2.3–4.2)
T4 FREE SERPL-MCNC: 1.15 NG/DL (ref 0.76–1.46)

## 2020-01-07 PROCEDURE — 84439 ASSAY OF FREE THYROXINE: CPT | Performed by: NURSE PRACTITIONER

## 2020-01-07 PROCEDURE — 84481 FREE ASSAY (FT-3): CPT | Performed by: NURSE PRACTITIONER

## 2020-01-07 PROCEDURE — 36415 COLL VENOUS BLD VENIPUNCTURE: CPT | Performed by: NURSE PRACTITIONER

## 2020-01-21 ENCOUNTER — MYC MEDICAL ADVICE (OUTPATIENT)
Dept: FAMILY MEDICINE | Facility: CLINIC | Age: 35
End: 2020-01-21

## 2020-01-21 ENCOUNTER — AMBULATORY - HEALTHEAST (OUTPATIENT)
Dept: SURGERY | Facility: CLINIC | Age: 35
End: 2020-01-21

## 2020-01-21 ENCOUNTER — COMMUNICATION - HEALTHEAST (OUTPATIENT)
Dept: SURGERY | Facility: CLINIC | Age: 35
End: 2020-01-21

## 2020-01-21 DIAGNOSIS — E66.811 CLASS 1 OBESITY: ICD-10-CM

## 2020-02-18 ENCOUNTER — OFFICE VISIT - HEALTHEAST (OUTPATIENT)
Dept: SURGERY | Facility: CLINIC | Age: 35
End: 2020-02-18

## 2020-02-18 ENCOUNTER — AMBULATORY - HEALTHEAST (OUTPATIENT)
Dept: LAB | Facility: CLINIC | Age: 35
End: 2020-02-18

## 2020-02-18 DIAGNOSIS — R63.2 HYPERPHAGIA: ICD-10-CM

## 2020-02-18 DIAGNOSIS — M25.569 KNEE PAIN, UNSPECIFIED CHRONICITY, UNSPECIFIED LATERALITY: ICD-10-CM

## 2020-02-18 DIAGNOSIS — E66.3 OVERWEIGHT: ICD-10-CM

## 2020-02-18 DIAGNOSIS — F32.A ANXIETY AND DEPRESSION: ICD-10-CM

## 2020-02-18 DIAGNOSIS — M54.50 LOW BACK PAIN, UNSPECIFIED BACK PAIN LATERALITY, UNSPECIFIED CHRONICITY, UNSPECIFIED WHETHER SCIATICA PRESENT: ICD-10-CM

## 2020-02-18 DIAGNOSIS — M72.2 PLANTAR FASCIITIS: ICD-10-CM

## 2020-02-18 DIAGNOSIS — F41.9 ANXIETY AND DEPRESSION: ICD-10-CM

## 2020-02-18 LAB
FASTING STATUS PATIENT QL REPORTED: NORMAL
FERRITIN SERPL-MCNC: 47 NG/ML (ref 10–130)
HDLC SERPL-MCNC: 53 MG/DL
LDLC SERPL CALC-MCNC: 85 MG/DL
PTH-INTACT SERPL-MCNC: 47 PG/ML (ref 10–86)
VIT B12 SERPL-MCNC: 1588 PG/ML (ref 213–816)

## 2020-02-18 ASSESSMENT — MIFFLIN-ST. JEOR: SCORE: 1553.18

## 2020-02-19 LAB — 25(OH)D3 SERPL-MCNC: 49.6 NG/ML (ref 30–80)

## 2020-02-28 ENCOUNTER — OFFICE VISIT - HEALTHEAST (OUTPATIENT)
Dept: SURGERY | Facility: CLINIC | Age: 35
End: 2020-02-28

## 2020-02-28 DIAGNOSIS — E66.811 CLASS 1 OBESITY WITH BODY MASS INDEX (BMI) OF 31.0 TO 31.9 IN ADULT: ICD-10-CM

## 2020-02-28 DIAGNOSIS — Z71.3 NUTRITIONAL COUNSELING: ICD-10-CM

## 2020-02-28 ASSESSMENT — MIFFLIN-ST. JEOR: SCORE: 1543.2

## 2020-05-31 ENCOUNTER — NURSE TRIAGE (OUTPATIENT)
Dept: NURSING | Facility: CLINIC | Age: 35
End: 2020-05-31

## 2020-05-31 NOTE — TELEPHONE ENCOUNTER
Donated blood on 5/20/20.  That Friday when working on just over the size of a gulf ball formed on the inner side of her elbow.  This turned into a bruise.  State she has had bruising before with   In the vein just above where they took the blood there is a hard rock lump the size of a pea. Lump is along the vein right above where they had the needle.   She can see it, not tender.     Additional Information    Negative: Patient sounds very sick or weak to the triager    Negative: Small growth, spot, bump, or pigmented area of skin (e.g., moles, skin tags, wart, melanoma, skin cancer)    Negative: Inguinal hernia previously diagnosed by a physician    Negative: Followed a skin injury    Negative: Follows an insect bite    Negative: Swelling of lymph node suspected    Negative: Swelling of vaccination site    Negative: Swelling of tongue    Negative: Swelling of lip    Negative: Swelling of eye    Negative: Swelling of entire face    Negative: Swelling of scrotum    Negative: Swelling of labia    Negative: Swelling of surgical incision    Negative: Swelling of ankle joint    Negative: Swelling of elbow joint    Negative: Swelling of knee joint    Negative: Swelling with a skin rash    Negative: Sounds like a life-threatening emergency to the triager    Negative: SEVERE pain (e.g., excruciating)    Negative: [1] Swelling is painful to touch AND [2] fever    Negative: [1] Swelling is red AND [2] fever    Negative: [1] Swelling is red AND [2] size > 2 inches (5.0 cm) (Exception: itchy area of skin)    Negative: [1] Swelling of groin (inguinal area) AND [2] painful    Negative: [1] Swelling is painful to touch AND [2] no fever    Negative: Looks like a boil, infected sore, deep ulcer or other infected rash    Negative: [1] Small swelling or lump AND [2] unexplained AND [3] present > 1 week    Negative: [1] New-onset hernia suspected (reducible bulge in groin or abdomen; non-tender) AND [2] NO pain or vomiting    [1] Small  swelling or lump AND [2] unexplained AND [3] present < 1 week    Small painless lump at prior IV site    Negative: Severe difficulty breathing (e.g., struggling for each breath, speaks in single words)    Negative: Shock suspected (e.g., cold/pale/clammy skin, too weak to stand, low BP, rapid pulse)    Negative: Sounds like a life-threatening emergency to the triager    Negative: IV not running or running slowly    Negative: [1] Difficulty breathing AND [2] not severe    Negative: Arm is swollen, new onset (or leg swelling if IV in lower extremity)    Negative: Fever > 100.5 F (38.1 C)    Negative: Patient sounds very sick or weak to the triager    Negative: Pus or cloudy fluid from IV site    Negative: [1] Red streak at IV site AND [2] palpable cord    Negative: [1] Red streak at IV site AND [2] longer than 1 inch (2.5 cm)    Negative: [1] Skin redness AND [2] extends > 1 inch (2.5 cm) from IV site    Negative: Skin swelling at IV site (Exception: IV recently removed and small area of skin swelling)    Negative: [1] Raised bruise at IV site AND [2] size > 2 inches (5 cm) AND [3] expanding    Negative: [1] Pain at IV site or shooting up arm AND [2] IV running slowly    Negative: [1] Mild bleeding at IV site AND [2] not stopped after following CARE ADVICE    Negative: [1] Dressing needs to be changed (e.g., wet, soiled, loose, or open to air) AND [2] unable or unwilling to perform dressing change    Protocols used: SKIN LUMP OR LOCALIZED SWELLING-A-AH, IV SITE (SKIN) SYMPTOMS-A-AH

## 2020-11-29 ENCOUNTER — HEALTH MAINTENANCE LETTER (OUTPATIENT)
Age: 35
End: 2020-11-29

## 2021-02-03 ENCOUNTER — OFFICE VISIT (OUTPATIENT)
Dept: FAMILY MEDICINE | Facility: CLINIC | Age: 36
End: 2021-02-03
Payer: COMMERCIAL

## 2021-02-03 VITALS
DIASTOLIC BLOOD PRESSURE: 72 MMHG | WEIGHT: 205.4 LBS | OXYGEN SATURATION: 97 % | SYSTOLIC BLOOD PRESSURE: 100 MMHG | TEMPERATURE: 97.9 F | HEART RATE: 62 BPM | BODY MASS INDEX: 35.07 KG/M2 | HEIGHT: 64 IN

## 2021-02-03 DIAGNOSIS — R19.8 ABDOMINAL SYMPTOMS: Primary | ICD-10-CM

## 2021-02-03 LAB
ALBUMIN UR-MCNC: NEGATIVE MG/DL
APPEARANCE UR: ABNORMAL
B-HCG SERPL-ACNC: <1 IU/L (ref 0–5)
BACTERIA #/AREA URNS HPF: ABNORMAL /HPF
BILIRUB UR QL STRIP: NEGATIVE
COLOR UR AUTO: YELLOW
GLUCOSE UR STRIP-MCNC: NEGATIVE MG/DL
HCG UR QL: NEGATIVE
HGB UR QL STRIP: NEGATIVE
KETONES UR STRIP-MCNC: NEGATIVE MG/DL
LEUKOCYTE ESTERASE UR QL STRIP: NEGATIVE
NITRATE UR QL: NEGATIVE
NON-SQ EPI CELLS #/AREA URNS LPF: ABNORMAL /LPF
PH UR STRIP: 5.5 PH (ref 5–7)
RBC #/AREA URNS AUTO: ABNORMAL /HPF
SOURCE: ABNORMAL
SP GR UR STRIP: >1.03 (ref 1–1.03)
UROBILINOGEN UR STRIP-ACNC: 0.2 EU/DL (ref 0.2–1)
WBC #/AREA URNS AUTO: ABNORMAL /HPF

## 2021-02-03 PROCEDURE — 81025 URINE PREGNANCY TEST: CPT | Performed by: FAMILY MEDICINE

## 2021-02-03 PROCEDURE — 99214 OFFICE O/P EST MOD 30 MIN: CPT | Performed by: FAMILY MEDICINE

## 2021-02-03 PROCEDURE — 36415 COLL VENOUS BLD VENIPUNCTURE: CPT | Performed by: FAMILY MEDICINE

## 2021-02-03 PROCEDURE — 84702 CHORIONIC GONADOTROPIN TEST: CPT | Performed by: FAMILY MEDICINE

## 2021-02-03 PROCEDURE — 81001 URINALYSIS AUTO W/SCOPE: CPT | Performed by: FAMILY MEDICINE

## 2021-02-03 RX ORDER — UBIDECARENONE 100 MG
CAPSULE ORAL DAILY
COMMUNITY

## 2021-02-03 ASSESSMENT — MIFFLIN-ST. JEOR: SCORE: 1615.66

## 2021-02-03 NOTE — PATIENT INSTRUCTIONS
To schedule the ultrasound, call [phone].  Further recommendations to be given when results are out.    You will be contacted in 1-2 days for results of your lab tests.  Patient Education     Pelvic Pain, Uncertain Cause    Pelvic pain is pain felt in the lowest part of the belly (abdomen) and between the hipbones. The pain may occur suddenly and recently (acute). Or the pain may last for 6 months or longer (chronic).   There are many possible causes of pelvic pain. The pain may be due to a problem in the female reproductive system. Or it may be due to a problem in the digestive, urinary, or musculoskeletal systems.   Based on your visit today, the exact cause of your pelvic pain is not certain. Your condition doesn't seem to be serious at this time. But it is important for you to keep watching for any new symptoms or worsening of your condition.   General care  Your healthcare provider may advise a number of ways to help manage your pain. These can include:     Taking over-the-counter pain medicine. Stronger pain medicine may also be prescribed, if needed.    Applying heat to the pelvic area. Use a heating pad or a hot pack. Taking a hot bath may also help.    Getting plenty of rest.    Making certain lifestyle changes. These can include practicing good posture and getting regular exercise. Studies have shown that these changes help reduce pelvic pain in some women.    Seeing a physical therapist or pain specialist. These healthcare providers can discuss other ways to manage pain with you.    Acupressure or acupuncture.  Follow-up care  Follow up with your healthcare provider, or as advised.    When to get medical advice  Call your healthcare provider right away if any of the following occur:     Fever of 100.4 F or higher, or as directed by your healthcare provider    Pain gets worse or you have sudden, severe pain or new pain    Nausea, vomiting, sweating, or restlessness    Dizziness or fainting    Abnormal  vaginal discharge    Abnormal vaginal bleeding (especially bleeding after menopause)  Anthony last reviewed this educational content on 6/1/2020 2000-2020 [employer]. [address] [place] [address], [place] [address]. All rights reserved. This information is not intended as a substitute for professional medical care. Always follow your healthcare professional's instructions.

## 2021-02-03 NOTE — PROGRESS NOTES
Assessment & Plan     Abdominal symptoms  Patient reports symptoms she states similar to previous prenancies.  Discussed negative UPT today. Also no UTI on ua.  Unclear etiology.  Offered serum HCG measurement and pursuing pelvic US. She  concurred.  Advised tto observe symptoms. Return precautions discussed and given to patient.  Further recommendations when other results are out.  - HCG Qual, Urine ([id number])  - UA with Microscopic reflex to Culture  - US Pelvic Complete with Transvaginal  - HCG Quantitative Pregnancy, Blood ([id number])      Patient Instructions   To schedule the ultrasound, call [phone].  Further recommendations to be given when results are out.    You will be contacted in 1-2 days for results of your lab tests.  Patient Education     Pelvic Pain, Uncertain Cause    Pelvic pain is pain felt in the lowest part of the belly (abdomen) and between the hipbones. The pain may occur suddenly and recently (acute). Or the pain may last for 6 months or longer (chronic).   There are many possible causes of pelvic pain. The pain may be due to a problem in the female reproductive system. Or it may be due to a problem in the digestive, urinary, or musculoskeletal systems.   Based on your visit today, the exact cause of your pelvic pain is not certain. Your condition doesn't seem to be serious at this time. But it is important for you to keep watching for any new symptoms or worsening of your condition.   General care  Your healthcare provider may advise a number of ways to help manage your pain. These can include:     Taking over-the-counter pain medicine. Stronger pain medicine may also be prescribed, if needed.    Applying heat to the pelvic area. Use a heating pad or a hot pack. Taking a hot bath may also help.    Getting plenty of rest.    Making certain lifestyle changes. These can include practicing good posture and getting regular exercise. Studies have shown that these changes help reduce pelvic  "pain in some women.    Seeing a physical therapist or pain specialist. These healthcare providers can discuss other ways to manage pain with you.    Acupressure or acupuncture.  Follow-up care  Follow up with your healthcare provider, or as advised.    When to get medical advice  Call your healthcare provider right away if any of the following occur:     Fever of 100.4 F or higher, or as directed by your healthcare provider    Pain gets worse or you have sudden, severe pain or new pain    Nausea, vomiting, sweating, or restlessness    Dizziness or fainting    Abnormal vaginal discharge    Abnormal vaginal bleeding (especially bleeding after menopause)  [place] last reviewed this educational content on 6/1/2020 2000-2020 [employer]. [address] [place] [address], [place] [address]. All rights reserved. This information is not intended as a substitute for professional medical care. Always follow your healthcare professional's instructions.               Return if symptoms worsen or fail to improve or depending on test results.    Isaiah Hardy MD  [facility]     Amarilis Zamudio is a 35 year old who presents to clinic today for the following health issues     HPI   Chief Complaint   Patient presents with     Abdominal Symptoms         Concern - Movement in Abdomen   Onset: 2 weeks   Description: patient has been feeling \"movement \" in her abdomen . Feeling like a baby is in there moving. Also has pressure. Low Abdomen, Pelvic Area   Intensity: moderate  Progression of Symptoms:  same  Accompanying Signs & Symptoms: \"fast\" weight Gain , Tender, Swollen Breasts   Previous history of similar problem: only with pregnancies  Precipitating factors:        Worsened by: none   Alleviating factors:        Improved by: none   Therapies tried and outcome: Patient did take home pregnancy test last week - was Negative. She has had Essure , Ablation. She does not get her " "periods after having those procedures. Patient is concerned this could be a ectopic pregnancy .     Patient reports she had one negative pregnancy in the last 2 weeks.    Patient reports has had Essure for contraception. Had uterine ablation due to heavy bleeding.  Not on any other contraceptive.    Still has uterus.  Patient has not been having periods since ablation in summer of 2018    Patient is sexually active. No condoms used everytime.    Review of Systems   Constitutional, HEENT, cardiovascular, pulmonary, GI, , musculoskeletal, neuro, skin, endocrine and psych systems are negative, except as otherwise noted.      Objective    /72 (BP Location: Left arm, Patient Position: Chair, Cuff Size: Adult Large)   Pulse 62   Temp 97.9  F (36.6  C) (Tympanic)   Ht 1.632 m (5' 4.25\")   Wt 93.2 kg (205 lb 6.4 oz)   SpO2 97%   Breastfeeding No   BMI 34.98 kg/m    Body mass index is 34.98 kg/m .  Physical Exam   GEN: alert, oriented x 3, NAD  NECK: no mass; no thyromegaly  ABD: rounded, flabby, fundus not palpable, mild RLQ tenderness, no palpable mass, fetal doppler did not detect heart tones    Results for orders placed or performed in visit on 02/03/21   HCG Qual, Urine ([id number])     Status: None   Result Value Ref Range    HCG Qual Urine Negative NEG^Negative   UA with Microscopic reflex to Culture     Status: Abnormal    Specimen: Midstream Urine   Result Value Ref Range    Color Urine Yellow     Appearance Urine Slightly Cloudy     Glucose Urine Negative NEG^Negative mg/dL    Bilirubin Urine Negative NEG^Negative    Ketones Urine Negative NEG^Negative mg/dL    Specific Gravity Urine >1.030 1.003 - 1.035    pH Urine 5.5 5.0 - 7.0 pH    Protein Albumin Urine Negative NEG^Negative mg/dL    Urobilinogen Urine 0.2 0.2 - 1.0 EU/dL    Nitrite Urine Negative NEG^Negative    Blood Urine Negative NEG^Negative    Leukocyte Esterase Urine Negative NEG^Negative    Source Midstream Urine     WBC Urine 0 - 5 OTO5^0 - " 5 /HPF    RBC Urine O - 2 OTO2^O - 2 /HPF    Squamous Epithelial /LPF Urine Moderate (A) FEW^Few /LPF    Bacteria Urine Few (A) NEG^Negative /HPF

## 2021-02-06 ENCOUNTER — HOSPITAL ENCOUNTER (OUTPATIENT)
Dept: ULTRASOUND IMAGING | Facility: CLINIC | Age: 36
Discharge: HOME | End: 2021-02-06
Attending: FAMILY MEDICINE | Admitting: FAMILY MEDICINE
Payer: COMMERCIAL

## 2021-02-06 PROCEDURE — 76830 TRANSVAGINAL US NON-OB: CPT

## 2021-06-04 VITALS
DIASTOLIC BLOOD PRESSURE: 68 MMHG | HEART RATE: 57 BPM | HEIGHT: 65 IN | SYSTOLIC BLOOD PRESSURE: 108 MMHG | RESPIRATION RATE: 14 BRPM | OXYGEN SATURATION: 97 % | BODY MASS INDEX: 31.49 KG/M2 | WEIGHT: 189 LBS

## 2021-06-04 VITALS — BODY MASS INDEX: 31.12 KG/M2 | WEIGHT: 186.8 LBS | HEIGHT: 65 IN

## 2021-06-06 NOTE — PATIENT INSTRUCTIONS - HE
[facility] Bariatric Basics    Remember to:    -Eat 3 meals a day (not 2, not 5) Chew your food well/SLOW down  -Eat your protein first  -Be a water drinker/Minize liquid calories (no regular pop, no juice) skim or 1% milk OK  -Sleep 7-8 hours each night. Address sleep if problematic  -Stress management is important. Address if problematic  -Move-8000 steps daily Muscle: maintain your muscle mass (strength training 2X/wk)  -Wheat, not white (bread, pasta, crackers, jonathan, bagels, tortillas, rice)  -Limit restaurant, cafeteria, take out, drive through to 2 times per week or less  -Minimize caffeine, alcohol, and night-time snacking  -Consider keeping a food diary (i.e. My Fitness Pal, Lose It, or other food tracker)  -Follow up with the dietitian      **Some lean proteins: chicken, turkey, tuna, salmon, crab, fish, shrimp, scallops, lobster, lean cuts of beef and pork, luncheon meats, veggie burgers, beans (black, lima, garbanzo, ta, kidney, refried), chile, cottage cheese, string cheese, other cheese, eggs, tofu, peanut butter, nuts, vegan crumbles, greek yogurt    MEDICATIONS FOR WEIGHT LOSS    PHENTERMINE (Adipex): approved in 1959 for appetite suppression.  It has stimulant effects and cannot be used with Ritalin, Concerta, or other stimulants.  It is not addictive although it's chemically related to amphetamines.  Amphetamines are addictive. The most common side effects are dry mouth, increased energy and concentration, increased pulse, and constipation.  You should not take phentermine if you have glaucoma, hyperthyroidism, or uncontrolled/untreated hypertension.  $24-$30 for 90 tablets    PHENDIMETRAZINE (Bontril): Appetite suppressant/sympathomimetic.  Controlled substance.  Side effects and contraindications similar to phentermine.  $45-$60 for 3 month supply    TOPIRAMATE (Topamax): Anti-seizure medication, also used to prevent migraines.  Side effects include paresthesia, glaucoma, altered concentration,  attention difficulties, memory and speech problems, metabolic acidosis, depression, increase in body temperature and decrease sweating, kidney stones, and weight loss.  Do not take Topamax while taking Depakote as this can cause high ammonia levels.  You must have reliable birth control as Topamax can cause birth defects.  Discontinue slowly to avoid seizure.  Insurance usually covers Topiramate.    QSYMIA (Phentermine + Topamax):  See above information about phentermine and Topamax.  Most common side effects are paresthesia, dizziness, distortion of taste, insomnia, constipation, and dry mouth.  $150-$220 per month      DIETHYLPROPRION (Tenuate): Sympathomimetic amine.  Appetite suppressant.  Doses 25 mg before meals or 75 mg per day.  Most common side effects are hypertension, palpitations, EKG changes, and increased seizures in epileptics.  There can be a possible adverse reaction with alcohol.  $70-$90 per 3 months    XENICAL(Orlistat) ([place] [facility]): Approved in 1999.  A fat-blocker.  It reduces absorption of fat by approximately 30%.  It has beneficial effects on lipid levels.  Side effects include diarrhea, abdominal cramping, fecal incontinence, oily spotting, and flatus with discharge.  Side effects are minimized if the patient limits their dietary fat to no more than 30% of their diet.  Patients must take a multivitamin daily to avoid vitamin D, E, A, and K deficiency.  $120 per month      CONTRAVE (naltrexone/buproprion): Approved in 2014.  It is a combination pill including an opioid receptor blocker and a long-standing antidepressant.  Most common side effects include nausea, constipation, headache, vomiting, dizziness, trouble sleeping, dry mouth, and diarrhea.  With all antidepressants watch for mood changes and suicide ideation.  Bupropion has been known to lower the seizure threshold in those prone to seizures.  It should not be used in a patient with a recent history of bulimia. It has been associated  with liver damage from taking higher than recommended doses.  Do not use countrave if you have taken opioid medications or opioid street drugs in the past 7-10 days, if you are currently on opioids, methadone, or if you are pregnant.  Do not use contrave if you have recently stopped using alcohol or benzodiazepines.  Taper off contrave slowly.  Dosing: titrate up to 2 tablets twice daily of the Naltrexone 8 mg/ Buproprion 90 mg tablets.  $200 for 90 tablets    SAXENDA (Liraglutide): A daily injectable (3mg daily) medication used for type 2 diabetes. Glucagon-like peptide-1 (GLP-1) agonist. Contraindications include personal or family history of medullary thyroid cancer or MEN type 2. Acute pancreatitis has been observed in patients taking liraglutide. Liraglutide causes C-cell tumors in rats and mice. It is unknown whether liraglutide causes tumors in humans. Start at 0.6mg, increasing the dose weekly up to 3mg.     VYVANSE (Lisdexamfetamine dimesylate): a CNS stimulant used to treat ADHD. Indicated for the treatment of moderate to severe Binge Eating Disorder in Adults. Contraindicated in patients with known heart disease, structural abnormalities of the heart, serious heart arrhythmias or unexplained syncope. CNS stimulants such as vyvanse may cause manic or psychotic symptoms in patients with BPAD or pre-existing psychosis. Use with caution in patients with Raynaud's phenomenon. Most common side effects include dry mouth, insomnia, decreased appetite, increased heart rate, jittery feeling, constipation and anxiety.       [facility]

## 2021-06-06 NOTE — PROGRESS NOTES
Medical  Weight Loss Initial Diet Evaluation  Amarilis is presenting today for a new weight management nutrition consultation. Pt has had an initial appointment with Dr. Garnica.   Weight loss medication: Phentermine.   Weight Loss Goal: around 150 lbs   Nutrition Assessment:   Anthropometrics:  Pt's Initial Weight: 189 lbs  Weight: 186 lb 12.8 oz (84.7 kg)  Weight loss from initial: 2.2  % Weight loss: 1.16 %    BMI:   Vitals:    02/28/20 1523   Weight: 186 lb 12.8 oz (84.7 kg)      IBW: 125-135 lbs   Estimated RMR (Grainger-St Jeor equation): 1551 kcals/day    Recommended Protein Intake: 60-80 grams of protein/day  Medical History:  Patient Active Problem List   Diagnosis     Anxiety and depression     Plantar fasciitis     Low back pain     Knee pain     Diabetes: No   Nutrition History:   Food allergies/intolerances/cultural or religous food customs: Yes- Beef, Lactose, Beer, Spicy, Soy  Vitamins/Mineral Supplementation: Vitamin D3, Omega 3, Mg, Tumeric, Fe, Probiotic  Dietary Recall:  Breakfast: Protein Bar   Snack:Oatmeal with quinoa, honey, almond milk or Turkey Sausage   Lunch:Chicken or Turkey, Quinoa, Vegetables (baby carrots) and Hummus  Snack: none   Dinner:Brats, Quinoa wrapped in a tortilla or Turkey or Chicken or Pork Chops with Vegetables   Snack: none  Overnight eating: No  Eating out (frequency/week): 1-3 times/month   Hydration (type/oz. per day):  Water:  oz/day   Caffeine:Coffee on the weekends as her treat; vanilla, hazelnut, almond milk ; Hot Tea   Alcohol : 0-1 time/week   Exercise:  Routine exercise established: Yes  Cardio and Weight Lifting   5-6 times/week for 60 minutes +     Nutrition Diagnosis (PES statement):   Overweight/Obesity (NC 3.3) related to overeating and poor lifestyle habits as evidenced by patient report of ongoing struggle with weight loss and BMI of 31.09 kg/m2.   Nutrition Intervention:  1. Food and/or Nutrient Delivery   a. Placed emphasis on importance of developing  a healthy meal routine, aiming for 3 meals a day and no snacks.  2. Nutrition Education   a. Discussed with patient how to build a meal: the importance of including a lean/low fat protein at each meal, include a source of vegetables at a minimum of lunch and dinner and limiting carbohydrate intake to 1 serving per meal.  b. Educated on sources of lean protein, portion sizes, the amount of grams found in each source. Recommend patient to aim for 20-30g protein at each meal.  c. Discussed the importance of adequate hydration, with emphasis on drinking 64oz of water or zero calorie beverages per day.  3. Nutrition Counseling   a. Encouraged importance of continuation of routine exercise for health benefits and weight loss.  b. Discussed mindful eating techniques such as eating off smaller places/bowls, taking 20-30 minutes to eat in a calm/relaxed environment without distractions.    Goals established by patient:   1. Continue to track intake via food journal.    2. Consider modified liquid diet to help get past recent plateau in weight loss.   Nutrition Monitoring/Evaluation:   Follow up:  Pt will follow up in 1 month(s) with bariatrician and prn with dietitian.   Time spent with patient: 30 minutes  Mimi Pace RD   ABN: Yes

## 2021-06-20 NOTE — LETTER
Letter by Betsy Powell MD at      Author: Betsy Powell MD Service: -- Author Type: --    Filed:  Encounter Date: 1/21/2020 Status: Signed       1/21/2020    Amarilis Espinal  [address] [facility] [address]    Dear Billie Olmos and thank you for your interest in the Comprehensive Weight Management program at [facility]!     Your appointment is scheduled at our [facility] Office on Tuesday February 18, 2020 at 3:00 PM with Dr. Betsy Garnica. You must arrive early - see below:       Prior to your appointment you will be assigned health history forms and surveys that must be completed in [employer] before you arrive. Completing these forms in [employer] is required because it allows us to track health outcomes. These will be assigned and must be completed before every provider visit with us.    o [place] for [employer] at [email or web address]. You can call [employer] support at [phone] if you need help.    o If you have no internet access at home you may complete your health history and survey forms in the lobby at check-in but you must arrive 1 hour early for this option.    o Patients who complete the forms in [employer] prior to their appointment date may arrive 15 minutes early.    o If you arrive late, you may be asked to reschedule.     o If you need to cancel, please provide us three days notice.      Remember to call the member services number on the back of your insurance card - and ask specifically if there is a PLAN EXCLUSION for bariatric surgery.      Bring a list of your medications, including any over the counter medications or supplements.      Please do not bring children with you to your visits. Though we love kids, this is all about YOU and we want to give you our full attention.    Your appointment is scheduled at our [place] Office- [address] [facility], [address], [place] [address].  [phone].    We are excited that you have chosen our program and look forward to serving  you!    Sincerely,      [facility]
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
## 2021-07-03 NOTE — ADDENDUM NOTE
Addendum Note by Betsy Easton MD at 2/18/2020  3:00 PM     Author: Betsy Easton MD Service: -- Author Type: Physician    Filed: 2/18/2020  4:31 PM Encounter Date: 2/18/2020 Status: Signed    : Betsy Easton MD (Physician)    Addended by: BETSY EASTON on: 2/18/2020 04:31 PM        Modules accepted: Orders

## 2021-09-25 ENCOUNTER — HEALTH MAINTENANCE LETTER (OUTPATIENT)
Age: 36
End: 2021-09-25

## 2022-01-15 ENCOUNTER — HEALTH MAINTENANCE LETTER (OUTPATIENT)
Age: 37
End: 2022-01-15

## 2022-01-18 ENCOUNTER — OFFICE VISIT (OUTPATIENT)
Dept: OBGYN | Facility: CLINIC | Age: 37
End: 2022-01-18
Payer: COMMERCIAL

## 2022-01-18 VITALS
BODY MASS INDEX: 33.97 KG/M2 | WEIGHT: 199 LBS | TEMPERATURE: 97.7 F | HEART RATE: 60 BPM | RESPIRATION RATE: 16 BRPM | SYSTOLIC BLOOD PRESSURE: 125 MMHG | HEIGHT: 64 IN | DIASTOLIC BLOOD PRESSURE: 78 MMHG

## 2022-01-18 DIAGNOSIS — Z01.419 GYNECOLOGIC EXAM NORMAL: Primary | ICD-10-CM

## 2022-01-18 DIAGNOSIS — Z12.4 CERVICAL CANCER SCREENING: ICD-10-CM

## 2022-01-18 PROBLEM — L03.90 CELLULITIS AND ABSCESS: Status: ACTIVE | Noted: 2022-01-18

## 2022-01-18 PROBLEM — M25.569 KNEE PAIN: Status: ACTIVE | Noted: 2022-01-18

## 2022-01-18 PROBLEM — F41.9 ANXIETY AND DEPRESSION: Status: ACTIVE | Noted: 2022-01-18

## 2022-01-18 PROBLEM — F32.A ANXIETY AND DEPRESSION: Status: ACTIVE | Noted: 2022-01-18

## 2022-01-18 PROBLEM — H52.10 MYOPIA: Status: ACTIVE | Noted: 2022-01-18

## 2022-01-18 PROBLEM — L02.91 CELLULITIS AND ABSCESS: Status: RESOLVED | Noted: 2022-01-18 | Resolved: 2022-01-18

## 2022-01-18 PROBLEM — L03.90 CELLULITIS AND ABSCESS: Status: RESOLVED | Noted: 2022-01-18 | Resolved: 2022-01-18

## 2022-01-18 PROBLEM — L02.91 CELLULITIS AND ABSCESS: Status: ACTIVE | Noted: 2022-01-18

## 2022-01-18 PROCEDURE — G0145 SCR C/V CYTO,THINLAYER,RESCR: HCPCS | Performed by: OBSTETRICS & GYNECOLOGY

## 2022-01-18 PROCEDURE — 87624 HPV HI-RISK TYP POOLED RSLT: CPT | Performed by: OBSTETRICS & GYNECOLOGY

## 2022-01-18 PROCEDURE — 99202 OFFICE O/P NEW SF 15 MIN: CPT | Performed by: OBSTETRICS & GYNECOLOGY

## 2022-01-18 ASSESSMENT — MIFFLIN-ST. JEOR: SCORE: 1581.63

## 2022-01-18 ASSESSMENT — PATIENT HEALTH QUESTIONNAIRE - PHQ9: SUM OF ALL RESPONSES TO PHQ QUESTIONS 1-9: 13

## 2022-01-18 NOTE — NURSING NOTE
"Initial /78 (BP Location: Right arm, Patient Position: Chair, Cuff Size: Adult Regular)   Pulse 60   Temp 97.7  F (36.5  C) (Tympanic)   Resp 16   Ht 1.632 m (5' 4.25\")   Wt 90.3 kg (199 lb)   BMI 33.89 kg/m   Estimated body mass index is 33.89 kg/m  as calculated from the following:    Height as of this encounter: 1.632 m (5' 4.25\").    Weight as of this encounter: 90.3 kg (199 lb). .    Celeste Turpin LPN  "

## 2022-01-18 NOTE — PROGRESS NOTES
"CC: \"passing larger soft tissue (bigger than a quarter), may want pap since pt hasn't had one since 2018\"  Source: patient/chart    Amarilis is a 36 year old  here for follow up of vaginal issues.  She had an ablation 2018.  Menses are minimal.  Can still \"feel her hormonal changes\", but she occasional sees passage of \"tissue\".      Pelvic U/S (2021):  Endometrium: Endometrium is 6 mm thick.  Uterus: Measures 7.8 x 3.5 x 5.6 cm. No uterine fibroids.  Right ovary: Measures 2.8 x 1.7 x 2.4 cm. It demonstrates normal  follicular structure and color-flow.  Left ovary: Measures 3.4 x 2.1 x 3.3 cm. It demonstrates normal  follicular structure and color-flow. There is 1.8 x 1.6 x 1.6 cm  hypoechoic cyst, likely represent dominant follicle.  Additional findings: No significant free fluid in the pelvis. Essure  coils are noted bilaterally.                                                             IMPRESSION:    1. 1.8 cm hypoechoic cystic focus in the left ovary, likely represent  dominant follicle. Otherwise no adnexal masses visualized.  2. The endometrial stripe measures 6 mm. No evidence of intrauterine  gestational sac to suggest the presence of pregnancy. Correlate with  beta hCG.       ROS: Ten point review of systems was reviewed and negative except the above.    PMH: Her past medical, surgical, and obstetric histories were reviewed and are documented in their appropriate chart areas.    ALL/Meds: Her medication and allergy histories were reviewed and are documented in their appropriate chart areas.    Social History     Tobacco Use     Smoking status: Never Smoker     Smokeless tobacco: Never Used   Vaping Use     Vaping Use: Never used   Substance Use Topics     Alcohol use: Yes     Comment: rare     Drug use: No      FH: Her family history was reviewed and documented in its appropriate chart area.     PE: /78 (BP Location: Right arm, Patient Position: Chair, Cuff Size: Adult Regular)   Pulse 60   " "Temp 97.7  F (36.5  C) (Tympanic)   Resp 16   Ht 1.632 m (5' 4.25\")   Wt 90.3 kg (199 lb)   BMI 33.89 kg/m      General Appearance:  healthy, alert, active, no distress  HEENT: NCAT  Abdomen: Soft, nontender.  Normal bowel sounds.  No masses  Pelvic exam: normal vagina and vulva, normal cervix without lesions or tenderness, pap smear done.    A/P 36 year old  here for     ICD-10-CM    1. Gynecologic exam normal  Z01.419    2. Cervical cancer screening  Z12.4 Pap screen with HPV - recommended age 30 - 65 years        1. Reassured patient that her pelvic exam is normal.  I am unsure what kind of \"tissue\" she is seeing, but I suspect she may be seeing cervical mucous admixed with vaginal discharge.  Without actually seeing/testing what she has, I won't be able to render a diagnosis.  In the meantime, her exam is reassuring without evidence of cervical or vaginal masses.  Pap done today.     Lucy Tate M.D.      20 minutes spent on the date of the encounter doing chart review, review of test results, patient visit and documentation     "

## 2022-01-20 LAB
BKR LAB AP GYN ADEQUACY: NORMAL
BKR LAB AP GYN INTERPRETATION: NORMAL
BKR LAB AP HPV REFLEX: NORMAL
BKR LAB AP PREVIOUS ABNORMAL: NORMAL
PATH REPORT.COMMENTS IMP SPEC: NORMAL
PATH REPORT.COMMENTS IMP SPEC: NORMAL
PATH REPORT.RELEVANT HX SPEC: NORMAL

## 2022-01-24 LAB
HUMAN PAPILLOMA VIRUS 16 DNA: NEGATIVE
HUMAN PAPILLOMA VIRUS 18 DNA: NEGATIVE
HUMAN PAPILLOMA VIRUS FINAL DIAGNOSIS: NORMAL
HUMAN PAPILLOMA VIRUS OTHER HR: NEGATIVE

## 2022-12-26 ENCOUNTER — HEALTH MAINTENANCE LETTER (OUTPATIENT)
Age: 37
End: 2022-12-26

## 2023-04-16 ENCOUNTER — HEALTH MAINTENANCE LETTER (OUTPATIENT)
Age: 38
End: 2023-04-16

## 2023-05-23 ENCOUNTER — TRANSFERRED RECORDS (OUTPATIENT)
Dept: HEALTH INFORMATION MANAGEMENT | Facility: CLINIC | Age: 38
End: 2023-05-23

## 2023-10-04 ENCOUNTER — TRANSCRIBE ORDERS (OUTPATIENT)
Dept: OTHER | Age: 38
End: 2023-10-04

## 2023-10-04 DIAGNOSIS — N93.9 ABNORMAL UTERINE AND VAGINAL BLEEDING, UNSPECIFIED: Primary | ICD-10-CM

## 2023-10-16 ENCOUNTER — TELEPHONE (OUTPATIENT)
Dept: OBGYN | Facility: CLINIC | Age: 38
End: 2023-10-16

## 2023-10-16 ENCOUNTER — PREP FOR PROCEDURE (OUTPATIENT)
Dept: OBGYN | Facility: CLINIC | Age: 38
End: 2023-10-16

## 2023-10-16 ENCOUNTER — OFFICE VISIT (OUTPATIENT)
Dept: OBGYN | Facility: CLINIC | Age: 38
End: 2023-10-16
Payer: COMMERCIAL

## 2023-10-16 VITALS
SYSTOLIC BLOOD PRESSURE: 115 MMHG | BODY MASS INDEX: 32.19 KG/M2 | HEART RATE: 63 BPM | TEMPERATURE: 97.7 F | HEIGHT: 65 IN | RESPIRATION RATE: 18 BRPM | DIASTOLIC BLOOD PRESSURE: 71 MMHG | WEIGHT: 193.2 LBS

## 2023-10-16 DIAGNOSIS — N93.9 ABNORMAL UTERINE BLEEDING: Primary | ICD-10-CM

## 2023-10-16 DIAGNOSIS — N93.9 ABNORMAL UTERINE BLEEDING (AUB): Primary | ICD-10-CM

## 2023-10-16 PROCEDURE — 88305 TISSUE EXAM BY PATHOLOGIST: CPT | Performed by: PATHOLOGY

## 2023-10-16 PROCEDURE — 99213 OFFICE O/P EST LOW 20 MIN: CPT | Mod: 25 | Performed by: OBSTETRICS & GYNECOLOGY

## 2023-10-16 PROCEDURE — 58100 BIOPSY OF UTERUS LINING: CPT | Performed by: OBSTETRICS & GYNECOLOGY

## 2023-10-16 ASSESSMENT — PATIENT HEALTH QUESTIONNAIRE - PHQ9: SUM OF ALL RESPONSES TO PHQ QUESTIONS 1-9: 12

## 2023-10-16 NOTE — PROGRESS NOTES
[facility]  OB/GYN Clinic   Gynecology Consult Note    CC:     Chief Complaint   Patient presents with    Consult     Bleeding, cramping- bleeding after weight lifting and intercourse       HPI: Ms. Gould is a 38 year old  being seen for GYN consultation for abnormal uterine bleeding.   Was having spotting here and there since her ablation in 2018.   Not is having heavier bleeding with lifting and bleeding with sex. Bleeding from 1-3 days and is large heavy gushes. Also notices with heavy exercise as she does cross fit.   Passing clots - quarter - dime sized.   Always has left lower side pain. Reports issues with chronic left ovarian cyst.   Last week had a pregnancy fire crotch sensation.   Bleeding not coming monthly, just random.   Doing pelvic floor exercises at her gym.     Is really struggling with anxiety and depression. Had [facility], but has been struggling with making appointments.   Uses  as her support person. Not interested in medication.   Struggles with diarrhea or constipation for 5 days in a row, so hesitant to use medications.   No clear plans of intentions for self harm.     GYN Hx: No STIs. ?remote hx of abnormal, but normal over the past 10 years.      ROS: A 10 pt ROS was completed and found to be otherwise negative unless mentioned in the HPI.     PMH:   Past Medical History:   Diagnosis Date    Arthritis     hands, knees    Cellulitis and abscess 2022    Chickenpox     Esophageal reflux     Headache 2010    Infection due to 2019 novel coronavirus 2021    Migraine headache 2010    Blurred vision, vertigo prior to HA's.     Other specified anemias     Hx of Anemia    Plantar fasciitis     PONV (postoperative nausea and vomiting)        PSHx:   Past Surgical History:   Procedure Laterality Date    ARTHROSCOPY KNEE RT/LT  2005    Left knee surgery    AS HYSTEROS W PERMANENT FALLOPAIN IMPLANT      Essure    DILATION AND  CURETTAGE SUCTION  2010    DILATION AND CURETTAGE SUCTION performed by HENRIQUE TATE at [facility] OR    DILATION AND CURETTAGE, HYSTEROSCOPY, ABLATE ENDOMETRIUM, COMBINED N/A 2018    Procedure: COMBINED DILATION AND CURETTAGE, HYSTEROSCOPY, ABLATE ENDOMETRIUM;  Dilation and curettage, Hysteroscopy, endometrial ablation. ([place]);  Surgeon: Henrique Tate MD;  Location: [facility] OR    SHOULDER SURGERY Right 2019    TONSILLECTOMY Bilateral 2016    Procedure: TONSILLECTOMY;  Surgeon: Bradley Medrano MD;  Location:  OR    [facility] ORAL SURGERY PROCEDURE  age 18    [place] teeth       OBHx:   OB History    Para Term  AB Living   5 4 4 0 1 4   SAB IAB Ectopic Multiple Live Births   1 0 0 0 4      # Outcome Date GA Lbr Kemar/2nd Weight Sex Delivery Anes PTL Lv   5 Term 14 40w0d  4.37 kg (9 lb 10.2 oz) F Vag-Spont Local N NIKHIL      Name: IHSAN,BABY1 MECHE PEREZ      Apgar1: 7  Apgar5: 9   4 Term 11 39w0d  3.515 kg (7 lb 12 oz) F Vag-Spont Local N NIKHIL      Name: Gini      Apgar1: 9  Apgar5: 9   3 SAB 12/29/10 7w0d    AB, MISSED         Birth Comments: D&C   2 Term 09 39w6d  4.423 kg (9 lb 12 oz) M   N NIKHIL      Name: Hasmukh      Apgar1: 9  Apgar5: 9   1 Term 06 40w0d  3.515 kg (7 lb 12 oz) F    NIKHIL      Name: Mariela       Medications:   Probiotic Product (PRO-BIOTIC BLEND PO), 1 capsule daily  ASTRAGALUS PO, 1 capsule daily (Patient not taking: Reported on 10/16/2023)  co-enzyme Q-10 100 MG CAPS capsule, Take by mouth daily (Patient not taking: Reported on 10/16/2023)  Docosahexaenoic Acid (DHA PO), DHA/Vitamin D - 1 tablet daily (Patient not taking: Reported on 10/16/2023)  EPINEPHrine (EPIPEN/ADRENACLICK/OR ANY BX GENERIC EQUIV) 0.3 MG/0.3ML injection 2-pack, Inject 0.3 mLs (0.3 mg) into the muscle as needed for anaphylaxis (severe allergic reaction) (Patient not taking: Reported on 2018)  Ferrous Sulfate (IRON SUPPLEMENT PO), Take 325 mg by mouth daily.  (Patient not taking: Reported on 10/16/2023)  GINSENG PO, Fermented Ginsent - 1 tablet daily (Patient not taking: Reported on 10/16/2023)  Magnesium Gluconate (MAGNESIUM 27 PO), Take 1,250 mcg by mouth  (Patient not taking: Reported on 10/16/2023)  multivitamin, therapeutic (THERA-VIT) TABS tablet, Take 1 tablet by mouth daily (Patient not taking: Reported on 10/16/2023)  Omega-3 Fatty Acids (OMEGA-3 FISH OIL PO), Take 1 g by mouth daily. (Patient not taking: Reported on 10/16/2023)  OVER-THE-COUNTER, N Acetyl Cysteine - 1 tablet daily (Patient not taking: Reported on 10/16/2023)  QUERCETIN PO, 1 tablet daily (Patient not taking: Reported on 10/16/2023)  UNABLE TO FIND, MEDICATION NAME: Censor (Patient not taking: Reported on 1/18/2022)  UNABLE TO FIND, MEDICATION NAME: Procera (Patient not taking: Reported on 1/18/2022)    No current facility-administered medications on file prior to visit.      Allergies:      Allergies   Allergen Reactions    Adhesive Tape Dermatitis    Beef-Derived Products GI Disturbance    Bees Hives     Swelling      Lactose Diarrhea    Morphine Nausea and Vomiting    Soy Allergy GI Disturbance     IBS        Social History: She is working doing insurance claims now.   Social History     Socioeconomic History    Marital status:      Spouse name: Hang    Number of children: 3    Years of education: Not on file    Highest education level: Not on file   Occupational History    Occupation: Student     Employer: [employer]     Comment: Special     Occupation:  Part-time     Employer: [employer]   Tobacco Use    Smoking status: Never    Smokeless tobacco: Never   Vaping Use    Vaping Use: Never used   Substance and Sexual Activity    Alcohol use: Yes     Comment: rare    Drug use: No    Sexual activity: Yes     Partners: Male     Birth control/protection: Female Surgical     Comment: remarried   Other Topics Concern    Parent/sibling w/ CABG, MI or  angioplasty before 65F 55M? Yes     Comment: watching mom     Service Yes     Comment: 2 1/2 years [employer]    Blood Transfusions No    Caffeine Concern No    Occupational Exposure No    Hobby Hazards No    Sleep Concern No    Stress Concern No    Weight Concern Yes    Special Diet Yes     Comment: daily vitamins    Back Care No    Exercise Yes     Comment: bike ride    Bike Helmet No    Seat Belt Yes    Self-Exams Yes   Social History Narrative        3 children.     Working as      Social Determinants of Health     Financial Resource Strain: Not on file   Food Insecurity: Not on file   Transportation Needs: Not on file   Physical Activity: Not on file   Stress: Not on file   Social Connections: Not on file   Interpersonal Safety: Not on file   Housing Stability: Not on file     Social History     Socioeconomic History    Marital status:      Spouse name: Hang    Number of children: 3    Years of education: None    Highest education level: None   Occupational History    Occupation: Student     Employer: [employer]     Comment: [employer]     Occupation:  Part-time     Employer: [employer]   Tobacco Use    Smoking status: Never    Smokeless tobacco: Never   Vaping Use    Vaping Use: Never used   Substance and Sexual Activity    Alcohol use: Yes     Comment: rare    Drug use: No    Sexual activity: Yes     Partners: Male     Birth control/protection: Female Surgical     Comment: remarried   Other Topics Concern    Parent/sibling w/ CABG, MI or angioplasty before 65F 55M? Yes     Comment: watching mom     Service Yes     Comment: 2 1/2 years [employer]    Blood Transfusions No    Caffeine Concern No    Occupational Exposure No    Hobby Hazards No    Sleep Concern No    Stress Concern No    Weight Concern Yes    Special Diet Yes     Comment: daily vitamins    Back Care No    Exercise Yes     Comment: bike ride    Bike Helmet No    Seat Belt Yes     "Self-Exams Yes   Social History Narrative        3 children.     Working as        Family History:   Family History   Problem Relation Age of Onset    Hypertension Mother     Gynecology Mother         endometriosis    Hearing Loss Mother     Other - See Comments Mother         Lumpectomy    Obesity Father     Sleep Apnea Father     No Known Problems Brother     No Known Problems Brother     Breast Cancer Maternal Grandmother 55    Thyroid Disease Maternal Grandmother     Arthritis Maternal Grandmother     Alzheimer Disease Maternal Grandmother     Gynecology Maternal Grandmother         endometriosis    Osteoporosis Maternal Grandmother     Heart Disease Maternal Grandfather         MI    Cancer Maternal Grandfather         leukemia    Cardiovascular Maternal Grandfather     Hypertension Maternal Grandfather     Gynecology Paternal Grandmother         endometriosis    Eye Disorder Paternal Grandmother     Diabetes Paternal Grandfather     Depression Paternal Grandfather     Diverticulitis Paternal Grandfather     Hyperlipidemia Paternal Grandfather     Cancer - colorectal No family hx of        Physical Exam:   Vitals:    10/16/23 1408   BP: 115/71   BP Location: Left arm   Patient Position: Chair   Cuff Size: Adult Large   Pulse: 63   Resp: 18   Temp: 97.7  F (36.5  C)   TempSrc: Tympanic   Weight: 87.6 kg (193 lb 3.2 oz)   Height: 1.651 m (5' 5\")      Estimated body mass index is 32.15 kg/m  as calculated from the following:    Height as of this encounter: 1.651 m (5' 5\").    Weight as of this encounter: 87.6 kg (193 lb 3.2 oz).    Gen: Pleasant, talkative female in no apparent distress   Respiratory: breathing comfortably on room air   Cardiac: Regular rate, warm and well-perfused.   GI: Abd soft and non-tender  : External genitalia is free of lesion. Urethra and bartholin glands normal.  Vaginal mucosa is moist and pink without unusual discharge.  Cervix is without lesions or " discharge. Bimanual exam reveals mobile retroverted uterus without cervical motion tenderness.  Adenexa are mobile and non-tender bilaterally. No appreciable adnexal enlargement.   MSK: Grossly normal movement of all four extremities  Psych: mood and affect bright     Labs/Imaging:   For Patients: As a result of the 21st Century Cures Act, medical imaging exams and procedure reports are released immediately into your electronic medical record. You may view this report before your referring provider. If you have questions, please contact your health care provider.     EXAM: US PELVIS COMPLETE TA AND TV WITH DUPLEX   LOCATION: [facility]   DATE: 8/14/2023     INDICATION: Encounter For Other Specified Special Examinations   COMPARISON: None.   TECHNIQUE: Transabdominal scans were performed. Endovaginal ultrasound was performed to better visualize the adnexa. Color flow with spectral Doppler and waveform analysis performed.     FINDINGS:     UTERUS: 8 x 3.4 x 5.2 cm. Normal in size and position with no masses.     ENDOMETRIUM: 8 mm. Normal smooth endometrium.  Two cystic areas are noted within the endometrium measuring 8 x 4 x 6 mm and 10 x 5 x 5 mm.     RIGHT OVARY: 3.4 x 2.8 x 2.8 cm. Normal with arterial and venous duplex flow identified.     LEFT OVARY: 2.7 x 1.7 x 1.7 cm. Normal with arterial and venous duplex flow identified.     A&P: 39 yo P4 who presents for abnormal uterine bleeding.   Had been previously well-managed with uterine ablation, now having return of AUB.   I did recommend obtaining an endometrial biopsy today, but understands the limitations of uterine sampling after having an ablation.   I did discuss that treatment options after an ablation are limited to a hysterectomy, as we are unable to assure complete sampling of the uterus. I discussed that medications, devices, etc are contraindicated after an ablation. I discussed that she would be a great candidate for a vaginal  hysterectomy with removal of the cervix, uterus and fallopian tubes. This would also allow us to remove the essure coils, which could certainly be the cause of her chronic left uterine pain. Discussed the risks of bleeding, infection and intraabdominal injury (both recognized and unrecognized). I discussed the risk of conversion to laparoscopy and laparotomy. Discussed same day surgery expectations and expected recovery.     [facility] Endometrial Biopsy Procedure Note    Amarilis Baueraristides  1985  [id number]    The patient was counseled on the risks (including pain and bleeding). Verbal and written consent were obtained.      Technique: The patient was placed in the dorsal lithotomy position.  A speculum was placed in the vagina and the cervix visualized. The cervix was cleaned with betadine swabs x3. The rocket curet was passed through the cervix to the fundus with return of scant tissue. This was placed in specimen jar and sent for permanent pathology. All instruments were removed.  The patient tolerated the procedure well.  She was given post op instructions which included activity and pelvic restrictions.      Anx/Dep: pt reports really struggling with this, high PHQ-9 score with suicidal thoughts. Denies intent or plan. SI/HI precautions reviewed. Offered to start medication - declined. Declined mental health referral. Ok with referral to [facility] for potentially phone counseling, although notes that she is limited with [facility] restrictions.     Tasha March MD  OB/GYN  10/16/2023        '

## 2023-10-16 NOTE — PROGRESS NOTES
"Initial /71 (BP Location: Left arm, Patient Position: Chair, Cuff Size: Adult Large)   Pulse 63   Temp 97.7  F (36.5  C) (Tympanic)   Resp 18   Ht 1.651 m (5' 5\")   Wt 87.6 kg (193 lb 3.2 oz)   BMI 32.15 kg/m   Estimated body mass index is 32.15 kg/m  as calculated from the following:    Height as of this encounter: 1.651 m (5' 5\").    Weight as of this encounter: 87.6 kg (193 lb 3.2 oz). .    Depression Response    Patient completed the PHQ-9 assessment for depression and scored >9? Yes  Question 9 on the PHQ-9 was positive for suicidality? Yes  Does patient have current mental health provider? No    Is this a virtual visit? No    I personally notified the following: visit provider           "

## 2023-10-16 NOTE — TELEPHONE ENCOUNTER
"[id number]  Amarilis Zamudio    You are now scheduled for surgery at [facility].  Below are the details for your surgery.  Please read the \"Preparing for Your Surgery\" instructions and let us know if you have any questions.    Type of surgery: Transvaginal hysterectomy, bilateral salpingectomy, cystoscopy   Surgeon:  Tasha March MD  Location of surgery: [facility] OR    Date of surgery: 12/14/23    Time: 7:30am   Arrival Time: 6:00am    Time can change, to be confirmed a couple of days prior by pre-op surgery nurse.    Pre-Op Appt Date: Patient to schedule with a PCP or Family Practice Provider within 30 days to the surgery.  Post-Op Appt Date:     1/10/24    Time: 8:30am    Packet sent out: Yes  Pre-cert/Authorization completed:  TBD by Financial Securing Office.   MA Sterilization/Hysterectomy Acknowledgment Consent signed: Not Applicable    [facility]  [phone]    Fax: [phone]  Same Day Surgery [phone]  Fax: [phone]  [facility] [phone]    "

## 2023-10-19 LAB
PATH REPORT.COMMENTS IMP SPEC: NORMAL
PATH REPORT.COMMENTS IMP SPEC: NORMAL
PATH REPORT.FINAL DX SPEC: NORMAL
PATH REPORT.GROSS SPEC: NORMAL
PATH REPORT.MICROSCOPIC SPEC OTHER STN: NORMAL
PATH REPORT.RELEVANT HX SPEC: NORMAL
PHOTO IMAGE: NORMAL

## 2023-11-30 ENCOUNTER — TELEPHONE (OUTPATIENT)
Dept: OBGYN | Facility: CLINIC | Age: 38
End: 2023-11-30

## 2023-11-30 ENCOUNTER — MYC MEDICAL ADVICE (OUTPATIENT)
Dept: OBGYN | Facility: CLINIC | Age: 38
End: 2023-11-30

## 2023-11-30 NOTE — TELEPHONE ENCOUNTER
Completed paperwork was given to Tasha March MD to sign.  Will need to contact pt to see where to send.    -Margaret Lomax  [facility] [place]

## 2023-12-01 NOTE — TELEPHONE ENCOUNTER
Completed paperwork was faxed as requested to [phone].  Will keep a copy up front for a few weeks and then send it to be scanned into chart.    -Margaret Lomax  [facility] [place]

## 2023-12-01 NOTE — TELEPHONE ENCOUNTER
Left a message for patient to call back to see where to send.    -Margaret GRIFFIN [facility]  [facility] [place]  -[facility]  [phone] opt #2, 1, 2

## 2023-12-13 ENCOUNTER — ANESTHESIA EVENT (OUTPATIENT)
Dept: SURGERY | Facility: CLINIC | Age: 38
End: 2023-12-13
Payer: COMMERCIAL

## 2023-12-13 NOTE — ANESTHESIA PREPROCEDURE EVALUATION
Anesthesia Pre-Procedure Evaluation    Patient: Amarilis Zamudio   MRN: [id number] : 1985        Procedure : Procedure(s):  Transvaginal hysterectomy, bilateral salpingectomy, cystoscopy          Past Medical History:   Diagnosis Date     Arthritis     hands, knees     Cellulitis and abscess 2022     Chickenpox      Esophageal reflux      Headache 2010     Infection due to 2019 novel coronavirus 2021     Migraine headache 2010    Blurred vision, vertigo prior to HA's.      Other specified anemias     Hx of Anemia     Plantar fasciitis      PONV (postoperative nausea and vomiting)       Past Surgical History:   Procedure Laterality Date     ARTHROSCOPY KNEE RT/LT  2005    Left knee surgery     AS HYSTEROS W PERMANENT FALLOPAIN IMPLANT      Essure     DILATION AND CURETTAGE SUCTION  2010    DILATION AND CURETTAGE SUCTION performed by HENRIQUE TATE at [facility] OR     DILATION AND CURETTAGE, HYSTEROSCOPY, ABLATE ENDOMETRIUM, COMBINED N/A 2018    Procedure: COMBINED DILATION AND CURETTAGE, HYSTEROSCOPY, ABLATE ENDOMETRIUM;  Dilation and curettage, Hysteroscopy, endometrial ablation. ([place]);  Surgeon: Henrique Tate MD;  Location: [facility] OR     SHOULDER SURGERY Right 2019     TONSILLECTOMY Bilateral 2016    Procedure: TONSILLECTOMY;  Surgeon: Bradley Medrano MD;  Location:  OR     [facility] ORAL SURGERY PROCEDURE  age 18    [place] teeth      Allergies   Allergen Reactions     Adhesive Tape Dermatitis     Beef-Derived Products GI Disturbance     Bees Hives     Swelling       Lactose Diarrhea     Morphine Nausea and Vomiting     Soy Allergy GI Disturbance     IBS       Social History     Tobacco Use     Smoking status: Never     Smokeless tobacco: Never   Substance Use Topics     Alcohol use: Yes     Comment: rare      Wt Readings from Last 1 Encounters:   10/16/23 87.6 kg (193 lb 3.2 oz)        Anesthesia Evaluation   Pt has had prior anesthetic. Type: General and  "MAC.    History of anesthetic complications  - PONV.      ROS/MED HX  ENT/Pulmonary:  - neg pulmonary ROS     Neurologic:     (+)      migraines,                          Cardiovascular:     (+)  - -   -  - -                                 Previous cardiac testing   Echo: Date: Results:    Stress Test:  Date: Results:    ECG Reviewed:  Date: 12/6/23 Results:  Sinus bradycardia with sinus arrythmia  Cath:  Date: Results:      METS/Exercise Tolerance:     Hematologic:  - neg hematologic  ROS     Musculoskeletal: Comment: Lumbago      GI/Hepatic:     (+) GERD,                   Renal/Genitourinary:  - neg Renal ROS     Endo:  - neg endo ROS     Psychiatric/Substance Use:     (+) psychiatric history anxiety and depression       Infectious Disease:  - neg infectious disease ROS     Malignancy:  - neg malignancy ROS     Other:  - neg other ROS          Physical Exam    Airway        Mallampati: I   TM distance: > 3 FB   Neck ROM: full   Mouth opening: > 3 cm    Respiratory Devices and Support         Dental       (+) Minor Abnormalities - some fillings, tiny chips      Cardiovascular   cardiovascular exam normal          Pulmonary   pulmonary exam normal            OUTSIDE LABS:  CBC:   Lab Results   Component Value Date    WBC 11.1 (H) 06/06/2018    WBC 11.6 (H) 05/15/2018    HGB 13.9 06/06/2018    HGB 13.1 05/15/2018    HCT 40.3 06/06/2018    HCT 38.6 05/15/2018     06/06/2018     05/15/2018     BMP:   Lab Results   Component Value Date     05/15/2018     11/18/2013    POTASSIUM 3.6 05/15/2018    POTASSIUM 4.1 11/18/2013    CHLORIDE 102 05/15/2018    CHLORIDE 105 11/18/2013    CO2 29 05/15/2018    CO2 16 (L) 11/18/2013    BUN 20 05/15/2018    BUN 10 11/18/2013    CR 0.94 05/15/2018    CR 0.61 11/18/2013    GLC 82 05/15/2018     (H) 11/18/2013     COAGS: No results found for: \"PTT\", \"INR\", \"FIBR\"  POC:   Lab Results   Component Value Date    HCG Negative 02/03/2021    HCGS Negative " 01/02/2013     HEPATIC:   Lab Results   Component Value Date    ALBUMIN 3.9 05/15/2018    PROTTOTAL 7.5 05/15/2018    ALT 28 05/15/2018    AST 22 05/15/2018    ALKPHOS 56 05/15/2018    BILITOTAL 0.7 05/15/2018     OTHER:   Lab Results   Component Value Date    A1C 4.9 12/12/2019    ANTONIA 8.7 05/15/2018    LIPASE 23 11/18/2013    AMYLASE 83 01/02/2013    TSH 1.72 12/12/2019    T4 1.15 01/07/2020    CRP <2.9 12/12/2019    SED 8 12/12/2019       Anesthesia Plan    ASA Status:  2       Anesthesia Type: General.     - Airway: ETT   Induction: Intravenous.   Maintenance: TIVA.        Consents    Anesthesia Plan(s) and associated risks, benefits, and realistic alternatives discussed. Questions answered and patient/representative(s) expressed understanding.     - Discussed: Risks, Benefits and Alternatives for BOTH SEDATION and the PROCEDURE were discussed     - Discussed with:  Patient            Postoperative Care    Pain management: IV analgesics, Oral pain medications.   PONV prophylaxis: Ondansetron (or other 5HT-3), Dexamethasone or Solumedrol     Comments:               Gurinder Crocker CRNA, APRN CRNA    I have reviewed the pertinent notes and labs in the chart from the past 30 days and (re)examined the patient.  Any updates or changes from those notes are reflected in this note.

## 2023-12-14 ENCOUNTER — HOSPITAL ENCOUNTER (OUTPATIENT)
Facility: CLINIC | Age: 38
Discharge: HOME | End: 2023-12-14
Attending: OBSTETRICS & GYNECOLOGY | Admitting: OBSTETRICS & GYNECOLOGY
Payer: COMMERCIAL

## 2023-12-14 ENCOUNTER — ANESTHESIA (OUTPATIENT)
Dept: SURGERY | Facility: CLINIC | Age: 38
End: 2023-12-14
Payer: COMMERCIAL

## 2023-12-14 VITALS
SYSTOLIC BLOOD PRESSURE: 117 MMHG | WEIGHT: 196 LBS | RESPIRATION RATE: 16 BRPM | HEART RATE: 52 BPM | OXYGEN SATURATION: 96 % | TEMPERATURE: 98.1 F | DIASTOLIC BLOOD PRESSURE: 81 MMHG | BODY MASS INDEX: 32.62 KG/M2

## 2023-12-14 DIAGNOSIS — Z90.710 S/P VAGINAL HYSTERECTOMY: Primary | ICD-10-CM

## 2023-12-14 LAB
ANION GAP SERPL CALCULATED.3IONS-SCNC: 10 MMOL/L (ref 7–15)
BASOPHILS # BLD AUTO: 0.1 10E3/UL (ref 0–0.2)
BASOPHILS NFR BLD AUTO: 2 %
BUN SERPL-MCNC: 17.1 MG/DL (ref 6–20)
CALCIUM SERPL-MCNC: 8.7 MG/DL (ref 8.6–10)
CHLORIDE SERPL-SCNC: 107 MMOL/L (ref 98–107)
CREAT SERPL-MCNC: 0.78 MG/DL (ref 0.51–0.95)
DEPRECATED HCO3 PLAS-SCNC: 22 MMOL/L (ref 22–29)
EGFRCR SERPLBLD CKD-EPI 2021: >90 ML/MIN/1.73M2
EOSINOPHIL # BLD AUTO: 0.3 10E3/UL (ref 0–0.7)
EOSINOPHIL NFR BLD AUTO: 4 %
ERYTHROCYTE [DISTWIDTH] IN BLOOD BY AUTOMATED COUNT: 11.4 % (ref 10–15)
GLUCOSE SERPL-MCNC: 97 MG/DL (ref 70–99)
HCG UR QL: NEGATIVE
HCT VFR BLD AUTO: 40.5 % (ref 35–47)
HGB BLD-MCNC: 13.8 G/DL (ref 11.7–15.7)
IMM GRANULOCYTES # BLD: 0 10E3/UL
IMM GRANULOCYTES NFR BLD: 0 %
LYMPHOCYTES # BLD AUTO: 2 10E3/UL (ref 0.8–5.3)
LYMPHOCYTES NFR BLD AUTO: 28 %
MCH RBC QN AUTO: 30.6 PG (ref 26.5–33)
MCHC RBC AUTO-ENTMCNC: 34.1 G/DL (ref 31.5–36.5)
MCV RBC AUTO: 90 FL (ref 78–100)
MONOCYTES # BLD AUTO: 0.5 10E3/UL (ref 0–1.3)
MONOCYTES NFR BLD AUTO: 6 %
NEUTROPHILS # BLD AUTO: 4.3 10E3/UL (ref 1.6–8.3)
NEUTROPHILS NFR BLD AUTO: 60 %
NRBC # BLD AUTO: 0 10E3/UL
NRBC BLD AUTO-RTO: 0 /100
PLATELET # BLD AUTO: 248 10E3/UL (ref 150–450)
POTASSIUM SERPL-SCNC: 4.6 MMOL/L (ref 3.4–5.3)
RBC # BLD AUTO: 4.51 10E6/UL (ref 3.8–5.2)
SODIUM SERPL-SCNC: 139 MMOL/L (ref 135–145)
WBC # BLD AUTO: 7.2 10E3/UL (ref 4–11)

## 2023-12-14 PROCEDURE — 58262 VAG HYST INCLUDING T/O: CPT | Performed by: OBSTETRICS & GYNECOLOGY

## 2023-12-14 PROCEDURE — 85025 COMPLETE CBC W/AUTO DIFF WBC: CPT | Performed by: OBSTETRICS & GYNECOLOGY

## 2023-12-14 PROCEDURE — 250N000011 HC RX IP 250 OP 636: Performed by: OBSTETRICS & GYNECOLOGY

## 2023-12-14 PROCEDURE — 271N000001 HC OR GENERAL SUPPLY NON-STERILE: Performed by: OBSTETRICS & GYNECOLOGY

## 2023-12-14 PROCEDURE — 250N000009 HC RX 250: Performed by: NURSE ANESTHETIST, CERTIFIED REGISTERED

## 2023-12-14 PROCEDURE — 360N000076 HC SURGERY LEVEL 3, PER MIN: Performed by: OBSTETRICS & GYNECOLOGY

## 2023-12-14 PROCEDURE — 80048 BASIC METABOLIC PNL TOTAL CA: CPT | Performed by: OBSTETRICS & GYNECOLOGY

## 2023-12-14 PROCEDURE — 250N000011 HC RX IP 250 OP 636: Mod: JZ | Performed by: OBSTETRICS & GYNECOLOGY

## 2023-12-14 PROCEDURE — 258N000003 HC RX IP 258 OP 636: Performed by: NURSE ANESTHETIST, CERTIFIED REGISTERED

## 2023-12-14 PROCEDURE — 710N000012 HC RECOVERY PHASE 2, PER MINUTE: Performed by: OBSTETRICS & GYNECOLOGY

## 2023-12-14 PROCEDURE — 250N000013 HC RX MED GY IP 250 OP 250 PS 637: Performed by: NURSE ANESTHETIST, CERTIFIED REGISTERED

## 2023-12-14 PROCEDURE — 999N000141 HC STATISTIC PRE-PROCEDURE NURSING ASSESSMENT: Performed by: OBSTETRICS & GYNECOLOGY

## 2023-12-14 PROCEDURE — 250N000011 HC RX IP 250 OP 636: Performed by: NURSE ANESTHETIST, CERTIFIED REGISTERED

## 2023-12-14 PROCEDURE — 250N000009 HC RX 250: Performed by: OBSTETRICS & GYNECOLOGY

## 2023-12-14 PROCEDURE — 88307 TISSUE EXAM BY PATHOLOGIST: CPT | Mod: TC | Performed by: OBSTETRICS & GYNECOLOGY

## 2023-12-14 PROCEDURE — 250N000013 HC RX MED GY IP 250 OP 250 PS 637: Performed by: OBSTETRICS & GYNECOLOGY

## 2023-12-14 PROCEDURE — 81025 URINE PREGNANCY TEST: CPT | Performed by: OBSTETRICS & GYNECOLOGY

## 2023-12-14 PROCEDURE — 370N000017 HC ANESTHESIA TECHNICAL FEE, PER MIN: Performed by: OBSTETRICS & GYNECOLOGY

## 2023-12-14 PROCEDURE — 272N000001 HC OR GENERAL SUPPLY STERILE: Performed by: OBSTETRICS & GYNECOLOGY

## 2023-12-14 PROCEDURE — 710N000009 HC RECOVERY PHASE 1, LEVEL 1, PER MIN: Performed by: OBSTETRICS & GYNECOLOGY

## 2023-12-14 PROCEDURE — 36415 COLL VENOUS BLD VENIPUNCTURE: CPT | Performed by: OBSTETRICS & GYNECOLOGY

## 2023-12-14 RX ORDER — FENTANYL CITRATE 50 UG/ML
INJECTION, SOLUTION INTRAMUSCULAR; INTRAVENOUS PRN
Status: DISCONTINUED | OUTPATIENT
Start: 2023-12-14 | End: 2023-12-14

## 2023-12-14 RX ORDER — ACETAMINOPHEN 325 MG/1
975 TABLET ORAL ONCE
Status: DISCONTINUED | OUTPATIENT
Start: 2023-12-14 | End: 2023-12-14 | Stop reason: HOSPADM

## 2023-12-14 RX ORDER — ACETAMINOPHEN 325 MG/1
975 TABLET ORAL ONCE
Status: DISCONTINUED | OUTPATIENT
Start: 2023-12-14 | End: 2023-12-14

## 2023-12-14 RX ORDER — OXYCODONE HYDROCHLORIDE 5 MG/1
5 TABLET ORAL
Status: COMPLETED | OUTPATIENT
Start: 2023-12-14 | End: 2023-12-14

## 2023-12-14 RX ORDER — CEFAZOLIN SODIUM/WATER 2 G/20 ML
2 SYRINGE (ML) INTRAVENOUS
Status: DISCONTINUED | OUTPATIENT
Start: 2023-12-14 | End: 2023-12-14 | Stop reason: HOSPADM

## 2023-12-14 RX ORDER — PROPOFOL 10 MG/ML
INJECTION, EMULSION INTRAVENOUS CONTINUOUS PRN
Status: DISCONTINUED | OUTPATIENT
Start: 2023-12-14 | End: 2023-12-14

## 2023-12-14 RX ORDER — SODIUM CHLORIDE, SODIUM LACTATE, POTASSIUM CHLORIDE, CALCIUM CHLORIDE 600; 310; 30; 20 MG/100ML; MG/100ML; MG/100ML; MG/100ML
INJECTION, SOLUTION INTRAVENOUS CONTINUOUS
Status: DISCONTINUED | OUTPATIENT
Start: 2023-12-14 | End: 2023-12-14 | Stop reason: HOSPADM

## 2023-12-14 RX ORDER — TRANEXAMIC ACID 10 MG/ML
1 INJECTION, SOLUTION INTRAVENOUS ONCE
Status: COMPLETED | OUTPATIENT
Start: 2023-12-14 | End: 2023-12-14

## 2023-12-14 RX ORDER — HYDROXYZINE HYDROCHLORIDE 25 MG/1
25 TABLET, FILM COATED ORAL ONCE
Status: COMPLETED | OUTPATIENT
Start: 2023-12-14 | End: 2023-12-14

## 2023-12-14 RX ORDER — ONDANSETRON 4 MG/1
4 TABLET, ORALLY DISINTEGRATING ORAL EVERY 30 MIN PRN
Status: DISCONTINUED | OUTPATIENT
Start: 2023-12-14 | End: 2023-12-14 | Stop reason: HOSPADM

## 2023-12-14 RX ORDER — NALOXONE HYDROCHLORIDE 0.4 MG/ML
0.2 INJECTION, SOLUTION INTRAMUSCULAR; INTRAVENOUS; SUBCUTANEOUS
Status: DISCONTINUED | OUTPATIENT
Start: 2023-12-14 | End: 2023-12-14 | Stop reason: HOSPADM

## 2023-12-14 RX ORDER — HYDRALAZINE HYDROCHLORIDE 20 MG/ML
2.5-5 INJECTION INTRAMUSCULAR; INTRAVENOUS EVERY 10 MIN PRN
Status: DISCONTINUED | OUTPATIENT
Start: 2023-12-14 | End: 2023-12-14 | Stop reason: HOSPADM

## 2023-12-14 RX ORDER — KETOROLAC TROMETHAMINE 30 MG/ML
INJECTION, SOLUTION INTRAMUSCULAR; INTRAVENOUS PRN
Status: DISCONTINUED | OUTPATIENT
Start: 2023-12-14 | End: 2023-12-14

## 2023-12-14 RX ORDER — HYDROMORPHONE HCL IN WATER/PF 6 MG/30 ML
0.4 PATIENT CONTROLLED ANALGESIA SYRINGE INTRAVENOUS EVERY 5 MIN PRN
Status: DISCONTINUED | OUTPATIENT
Start: 2023-12-14 | End: 2023-12-14 | Stop reason: HOSPADM

## 2023-12-14 RX ORDER — EPHEDRINE SULFATE 50 MG/ML
INJECTION, SOLUTION INTRAMUSCULAR; INTRAVENOUS; SUBCUTANEOUS PRN
Status: DISCONTINUED | OUTPATIENT
Start: 2023-12-14 | End: 2023-12-14

## 2023-12-14 RX ORDER — PROPOFOL 10 MG/ML
INJECTION, EMULSION INTRAVENOUS PRN
Status: DISCONTINUED | OUTPATIENT
Start: 2023-12-14 | End: 2023-12-14

## 2023-12-14 RX ORDER — SODIUM CHLORIDE, SODIUM LACTATE, POTASSIUM CHLORIDE, CALCIUM CHLORIDE 600; 310; 30; 20 MG/100ML; MG/100ML; MG/100ML; MG/100ML
INJECTION, SOLUTION INTRAVENOUS CONTINUOUS PRN
Status: DISCONTINUED | OUTPATIENT
Start: 2023-12-14 | End: 2023-12-14

## 2023-12-14 RX ORDER — OXYCODONE HYDROCHLORIDE 5 MG/1
5-10 TABLET ORAL EVERY 4 HOURS PRN
Qty: 20 TABLET | Refills: 0 | Status: SHIPPED | OUTPATIENT
Start: 2023-12-14

## 2023-12-14 RX ORDER — CEFAZOLIN SODIUM/WATER 2 G/20 ML
2 SYRINGE (ML) INTRAVENOUS SEE ADMIN INSTRUCTIONS
Status: DISCONTINUED | OUTPATIENT
Start: 2023-12-14 | End: 2023-12-14 | Stop reason: HOSPADM

## 2023-12-14 RX ORDER — FENTANYL CITRATE 50 UG/ML
50 INJECTION, SOLUTION INTRAMUSCULAR; INTRAVENOUS EVERY 5 MIN PRN
Status: DISCONTINUED | OUTPATIENT
Start: 2023-12-14 | End: 2023-12-14 | Stop reason: HOSPADM

## 2023-12-14 RX ORDER — DEXAMETHASONE SODIUM PHOSPHATE 4 MG/ML
INJECTION, SOLUTION INTRA-ARTICULAR; INTRALESIONAL; INTRAMUSCULAR; INTRAVENOUS; SOFT TISSUE PRN
Status: DISCONTINUED | OUTPATIENT
Start: 2023-12-14 | End: 2023-12-14

## 2023-12-14 RX ORDER — AMOXICILLIN 250 MG
1-2 CAPSULE ORAL 2 TIMES DAILY
Qty: 30 TABLET | Refills: 0 | Status: SHIPPED | OUTPATIENT
Start: 2023-12-14

## 2023-12-14 RX ORDER — LIDOCAINE 40 MG/G
CREAM TOPICAL
Status: DISCONTINUED | OUTPATIENT
Start: 2023-12-14 | End: 2023-12-14 | Stop reason: HOSPADM

## 2023-12-14 RX ORDER — ONDANSETRON 2 MG/ML
INJECTION INTRAMUSCULAR; INTRAVENOUS PRN
Status: DISCONTINUED | OUTPATIENT
Start: 2023-12-14 | End: 2023-12-14

## 2023-12-14 RX ORDER — ONDANSETRON 2 MG/ML
4 INJECTION INTRAMUSCULAR; INTRAVENOUS EVERY 30 MIN PRN
Status: DISCONTINUED | OUTPATIENT
Start: 2023-12-14 | End: 2023-12-14 | Stop reason: HOSPADM

## 2023-12-14 RX ORDER — FENTANYL CITRATE 50 UG/ML
25 INJECTION, SOLUTION INTRAMUSCULAR; INTRAVENOUS EVERY 5 MIN PRN
Status: DISCONTINUED | OUTPATIENT
Start: 2023-12-14 | End: 2023-12-14 | Stop reason: HOSPADM

## 2023-12-14 RX ORDER — LIDOCAINE HYDROCHLORIDE AND EPINEPHRINE 10; 10 MG/ML; UG/ML
INJECTION, SOLUTION INFILTRATION; PERINEURAL PRN
Status: DISCONTINUED | OUTPATIENT
Start: 2023-12-14 | End: 2023-12-14 | Stop reason: HOSPADM

## 2023-12-14 RX ORDER — NALOXONE HYDROCHLORIDE 0.4 MG/ML
0.4 INJECTION, SOLUTION INTRAMUSCULAR; INTRAVENOUS; SUBCUTANEOUS
Status: DISCONTINUED | OUTPATIENT
Start: 2023-12-14 | End: 2023-12-14 | Stop reason: HOSPADM

## 2023-12-14 RX ORDER — IBUPROFEN 800 MG/1
800 TABLET, FILM COATED ORAL EVERY 6 HOURS PRN
Qty: 30 TABLET | Refills: 0 | Status: SHIPPED | OUTPATIENT
Start: 2023-12-14

## 2023-12-14 RX ORDER — ACETAMINOPHEN 325 MG/1
975 TABLET ORAL ONCE
Status: COMPLETED | OUTPATIENT
Start: 2023-12-14 | End: 2023-12-14

## 2023-12-14 RX ORDER — GLYCOPYRROLATE 0.2 MG/ML
INJECTION, SOLUTION INTRAMUSCULAR; INTRAVENOUS PRN
Status: DISCONTINUED | OUTPATIENT
Start: 2023-12-14 | End: 2023-12-14

## 2023-12-14 RX ORDER — KETAMINE HYDROCHLORIDE 10 MG/ML
INJECTION INTRAMUSCULAR; INTRAVENOUS PRN
Status: DISCONTINUED | OUTPATIENT
Start: 2023-12-14 | End: 2023-12-14

## 2023-12-14 RX ORDER — IBUPROFEN 200 MG
800 TABLET ORAL ONCE
Status: DISCONTINUED | OUTPATIENT
Start: 2023-12-14 | End: 2023-12-14 | Stop reason: HOSPADM

## 2023-12-14 RX ORDER — GABAPENTIN 300 MG/1
300 CAPSULE ORAL
Status: COMPLETED | OUTPATIENT
Start: 2023-12-14 | End: 2023-12-14

## 2023-12-14 RX ORDER — HYDROMORPHONE HCL IN WATER/PF 6 MG/30 ML
0.2 PATIENT CONTROLLED ANALGESIA SYRINGE INTRAVENOUS EVERY 5 MIN PRN
Status: DISCONTINUED | OUTPATIENT
Start: 2023-12-14 | End: 2023-12-14 | Stop reason: HOSPADM

## 2023-12-14 RX ORDER — PHENAZOPYRIDINE HYDROCHLORIDE 200 MG/1
200 TABLET, FILM COATED ORAL ONCE
Status: COMPLETED | OUTPATIENT
Start: 2023-12-14 | End: 2023-12-14

## 2023-12-14 RX ORDER — OXYCODONE HYDROCHLORIDE 5 MG/1
10 TABLET ORAL
Status: DISCONTINUED | OUTPATIENT
Start: 2023-12-14 | End: 2023-12-14 | Stop reason: HOSPADM

## 2023-12-14 RX ORDER — LIDOCAINE HYDROCHLORIDE 20 MG/ML
INJECTION, SOLUTION INFILTRATION; PERINEURAL PRN
Status: DISCONTINUED | OUTPATIENT
Start: 2023-12-14 | End: 2023-12-14

## 2023-12-14 RX ORDER — OXYCODONE HYDROCHLORIDE 5 MG/1
5-10 TABLET ORAL EVERY 4 HOURS PRN
Status: DISCONTINUED | OUTPATIENT
Start: 2023-12-14 | End: 2023-12-14 | Stop reason: HOSPADM

## 2023-12-14 RX ADMIN — SODIUM CHLORIDE, POTASSIUM CHLORIDE, SODIUM LACTATE AND CALCIUM CHLORIDE: 600; 310; 30; 20 INJECTION, SOLUTION INTRAVENOUS at 06:41

## 2023-12-14 RX ADMIN — SUGAMMADEX 200 MG: 100 INJECTION, SOLUTION INTRAVENOUS at 08:52

## 2023-12-14 RX ADMIN — ROCURONIUM BROMIDE 100 MG: 50 INJECTION, SOLUTION INTRAVENOUS at 07:37

## 2023-12-14 RX ADMIN — LIDOCAINE HYDROCHLORIDE 100 MG: 20 INJECTION, SOLUTION INFILTRATION; PERINEURAL at 07:37

## 2023-12-14 RX ADMIN — GABAPENTIN 300 MG: 300 CAPSULE ORAL at 06:30

## 2023-12-14 RX ADMIN — KETAMINE HYDROCHLORIDE 20 MG: 10 INJECTION INTRAMUSCULAR; INTRAVENOUS at 07:46

## 2023-12-14 RX ADMIN — KETAMINE HYDROCHLORIDE 30 MG: 10 INJECTION INTRAMUSCULAR; INTRAVENOUS at 08:02

## 2023-12-14 RX ADMIN — HYDROXYZINE HYDROCHLORIDE 25 MG: 25 TABLET, FILM COATED ORAL at 11:45

## 2023-12-14 RX ADMIN — ACETAMINOPHEN 975 MG: 325 TABLET, FILM COATED ORAL at 06:30

## 2023-12-14 RX ADMIN — OXYCODONE HYDROCHLORIDE 5 MG: 5 TABLET ORAL at 11:43

## 2023-12-14 RX ADMIN — PROPOFOL 200 MG: 10 INJECTION, EMULSION INTRAVENOUS at 07:37

## 2023-12-14 RX ADMIN — GLYCOPYRROLATE 0.2 MG: 0.2 INJECTION, SOLUTION INTRAMUSCULAR; INTRAVENOUS at 07:42

## 2023-12-14 RX ADMIN — DEXAMETHASONE SODIUM PHOSPHATE 10 MG: 4 INJECTION, SOLUTION INTRA-ARTICULAR; INTRALESIONAL; INTRAMUSCULAR; INTRAVENOUS; SOFT TISSUE at 07:54

## 2023-12-14 RX ADMIN — PHENYLEPHRINE HYDROCHLORIDE 100 MCG: 10 INJECTION INTRAVENOUS at 07:54

## 2023-12-14 RX ADMIN — FENTANYL CITRATE 50 MCG: 50 INJECTION INTRAMUSCULAR; INTRAVENOUS at 09:17

## 2023-12-14 RX ADMIN — TRANEXAMIC ACID 1 G: 10 INJECTION, SOLUTION INTRAVENOUS at 07:49

## 2023-12-14 RX ADMIN — PROPOFOL 200 MCG/KG/MIN: 10 INJECTION, EMULSION INTRAVENOUS at 07:37

## 2023-12-14 RX ADMIN — Medication 5 MG: at 07:50

## 2023-12-14 RX ADMIN — FENTANYL CITRATE 100 MCG: 50 INJECTION INTRAMUSCULAR; INTRAVENOUS at 07:37

## 2023-12-14 RX ADMIN — HYDROMORPHONE HYDROCHLORIDE 0.2 MG: 0.2 INJECTION, SOLUTION INTRAMUSCULAR; INTRAVENOUS; SUBCUTANEOUS at 10:10

## 2023-12-14 RX ADMIN — MIDAZOLAM 2 MG: 1 INJECTION INTRAMUSCULAR; INTRAVENOUS at 07:30

## 2023-12-14 RX ADMIN — LIDOCAINE HYDROCHLORIDE 0.1 ML: 10 INJECTION, SOLUTION EPIDURAL; INFILTRATION; INTRACAUDAL; PERINEURAL at 06:41

## 2023-12-14 RX ADMIN — HYDROMORPHONE HYDROCHLORIDE 1 MG: 1 INJECTION, SOLUTION INTRAMUSCULAR; INTRAVENOUS; SUBCUTANEOUS at 07:54

## 2023-12-14 RX ADMIN — ONDANSETRON 4 MG: 2 INJECTION INTRAMUSCULAR; INTRAVENOUS at 08:34

## 2023-12-14 RX ADMIN — SODIUM CHLORIDE, POTASSIUM CHLORIDE, SODIUM LACTATE AND CALCIUM CHLORIDE: 600; 310; 30; 20 INJECTION, SOLUTION INTRAVENOUS at 07:30

## 2023-12-14 RX ADMIN — FENTANYL CITRATE 50 MCG: 50 INJECTION INTRAMUSCULAR; INTRAVENOUS at 09:27

## 2023-12-14 RX ADMIN — Medication 5 MG: at 08:04

## 2023-12-14 RX ADMIN — KETOROLAC TROMETHAMINE 15 MG: 30 INJECTION, SOLUTION INTRAMUSCULAR at 08:47

## 2023-12-14 RX ADMIN — PHENAZOPYRIDINE 200 MG: 200 TABLET ORAL at 06:29

## 2023-12-14 RX ADMIN — HYDROMORPHONE HYDROCHLORIDE 0.2 MG: 0.2 INJECTION, SOLUTION INTRAMUSCULAR; INTRAVENOUS; SUBCUTANEOUS at 10:04

## 2023-12-14 RX ADMIN — Medication 2 G: at 07:26

## 2023-12-14 RX ADMIN — Medication 5 MG: at 07:45

## 2023-12-14 ASSESSMENT — ACTIVITIES OF DAILY LIVING (ADL)
ADLS_ACUITY_SCORE: 35

## 2023-12-14 NOTE — ANESTHESIA CARE TRANSFER NOTE
Patient: Amarilis Zamudio    Procedure: Procedure(s):  Transvaginal hysterectomy, bilateral salpingectomy, cystoscopy       Diagnosis: Abnormal uterine bleeding [N93.9]  Diagnosis Additional Information: No value filed.    Anesthesia Type:   General     Note:    Oropharynx: oropharynx clear of all foreign objects  Level of Consciousness: drowsy  Oxygen Supplementation: face mask  Level of Supplemental Oxygen (L/min / FiO2): 8  Independent Airway: airway patency satisfactory and stable  Dentition: dentition unchanged  Vital Signs Stable: post-procedure vital signs reviewed and stable  Report to RN Given: handoff report given  Patient transferred to: PACU    Handoff Report: Identifed the Patient, Identified the Reponsible Provider, Reviewed the pertinent medical history, Discussed the surgical course, Reviewed Intra-OP anesthesia mangement and issues during anesthesia, Set expectations for post-procedure period and Allowed opportunity for questions and acknowledgement of understanding  Vitals:  Vitals Value Taken Time   /77 12/14/23 0902   Temp     Pulse 88 12/14/23 0905   Resp 7 12/14/23 0905   SpO2 95 % 12/14/23 0905   Vitals shown include unfiled device data.    Electronically Signed By: Josie Vigil  December 14, 2023  9:06 AM

## 2023-12-14 NOTE — OR NURSING
Labs and urine sent. Preop meds given.  at bedside. Ready for surgery. Has left hip labrium tear and to be careful positioning.

## 2023-12-14 NOTE — ANESTHESIA PROCEDURE NOTES
Airway       Patient location during procedure: OR       Procedure Start/Stop Times: 12/14/2023 7:40 AM  Staff -        CRNA: Amanda Mark APRN CRNA       Other Anesthesia Staff: Josie Vigil       Performed By: SRNAIndications and Patient Condition       Indications for airway management: sharon-procedural       Induction type:intravenous       Mask difficulty assessment: 1 - vent by mask    Final Airway Details       Final airway type: endotracheal airway       Successful airway: ETT - single  Endotracheal Airway Details        ETT size (mm): 7.0       Cuffed: yes       Cuff volume (mL): 7       Successful intubation technique: video laryngoscopy       VL Blade Size: [place] 3       Grade View of Cords: 1       Adjucts: stylet       Position: Right       Measured from: gums/teeth       Secured at (cm): 22    Post intubation assessment        Placement verified by: capnometry and chest rise        Number of attempts at approach: 1       Secured with: tape       Ease of procedure: easy       Dentition: Intact and Unchanged    Medication(s) Administered   Medication Administration Time: 12/14/2023 7:40 AM

## 2023-12-14 NOTE — DISCHARGE INSTRUCTIONS
Same Day Surgery Discharge Instructions  Special Precautions After Surgery - Adult    It is not unusual to feel lightheaded or faint, up to 24 hours after surgery or while taking pain medication.  If you have these symptoms; sit for a few minutes before standing and have someone assist you when getting up.  You should rest and relax for the next 24 hours and must have someone stay with you for at least 24 hours after your discharge.  DO NOT DRIVE any vehicle or operate mechanical equipment for 24 hours following the end of your surgery.  DO NOT DRIVE while taking narcotic pain medications that have been prescribed by your physician.  If you had a limb operated on, you must be able to use it fully to drive.  DO NOT drink alcoholic beverages for 24 hours following surgery or while taking prescription pain medication.  Drink clear liquids (apple juice, ginger ale, broth, 7-Up, etc.).  Progress to your regular diet as you feel able.  Any questions call your physician and do not make important decisions for 24 hours.      Medications:  Tylenol next dose may be taken at 1:00 pm  Ibuprofen next dose may be taken at 3:00 pm  Oxycodone 5 mg 1-2 tablets every 4 hours as needed for pain  Senna s ( stool softeners) take 1-2 tablets twice a day to prevent constipation from narcotics       __________________________________________________________________________________________________________________________________  IMPORTANT NUMBERS:    [facility] Main Number:  [phone], [phone]  Pharmacy:  [phone]  Same Day Surgery:  [phone], Monday - Friday until 8:30 p.m.                                              OB Clinic:  [phone]  Dr. March's office number  Nurse Advice Line: [phone]

## 2023-12-14 NOTE — ANESTHESIA POSTPROCEDURE EVALUATION
Patient: Amarilis Zamudio    Procedure: Procedure(s):  Transvaginal hysterectomy, bilateral salpingectomy, cystoscopy       Anesthesia Type:  General    Note:  Disposition: Outpatient   Postop Pain Control: Uneventful            Sign Out: Well controlled pain   PONV: No   Neuro/Psych: Uneventful            Sign Out: Acceptable/Baseline neuro status   Airway/Respiratory: Uneventful            Sign Out: Acceptable/Baseline resp. status   CV/Hemodynamics: Uneventful            Sign Out: Acceptable CV status; No obvious hypovolemia; No obvious fluid overload   Other NRE: NONE   DID A NON-ROUTINE EVENT OCCUR? No       Last vitals:  Vitals Value Taken Time   /80 12/14/23 1015   Temp 36.8  C (98.3  F) 12/14/23 1000   Pulse 53 12/14/23 1018   Resp 7 12/14/23 1018   SpO2 94 % 12/14/23 1018   Vitals shown include unfiled device data.    Electronically Signed By: JINA Andres CRNA  December 14, 2023  2:08 PM

## 2023-12-14 NOTE — OP NOTE
[facility] Operative Note      Name: Amarilis Zamudio  MRN: [id number]  : 1985     Date: 2023     Preoperative Diagnosis: abnormal uterine bleeding     Postoperative Diagnosis: same     Procedure:   Transvaginal vaginal hysterectomy, bilateral salpingectomy, cystoscopy      Surgeon: Tasha March MD  Assists: Dr. Audelia Farrell - PGY4 OB/GYN resident     Anesthesia: GETA  Complications: none apparent      EBL: 100 mL   IVF: see anesthesia record   UOP: 100 mL clear urine      Findings: Exam under anesthesia revealed normal external genitalia. Small left groin skin tag. Bimanual exam with small, mid position uterus with no appreciable adnexal enlargement. Cystoscopy revealed no evidence of bladder injury or suture material. Bilateral ureteral urine jet stream noted.      Specimen: Uterus, cervix, bilateral fallopian tubes     Indications: Ms. Zamudio is a 38 year old P4 female who presented to GYN clinic with abnormal uterine bleeding, failed endometrial ablation. Treatment options were discussed and she desired surgical management with the above planned procedure.  I did discuss the risks of bleeding, infection as well as intra-abdominal injury. She is agreeable to a blood transfusion if indicated.  She understands the indication for conversion to laparoscopy or laparotomy. Informed consent was signed.      Procedure: The patient was taken to the operating room where GETA anesthesia was induced without difficulty. She was then examined under anesthesia with the above noted findings. She was then prepared and draped in the normal sterile fashion in the dorsal lithotomy position using yellow fin stirrups. A catheter was placed in the bladder.      Attention was then turned to the vagina where 1%lidocaine with epinephrine and vasopressin was injected along the cervicovaginal junction. This plane was incised with cautery. The anterior peritoneum was entered with metzenbaum  scissors and the posterior peritoneum was entered with navarro scissors. Retractors were placed. The right uterosacral ligament was grasped with a Mayank clamp, incised and suture ligated. This was repeated on the left uterosacral ligament. The right uterine artery was similarly clamp, incised and suture ligated. This was repeated on the left side. The remaining parametrial tissue with clamped, cut and suture ligated until the cervix and uterus could be removed vaginally. The fallopian tubes were then individually grasped with yovani clamps, walked to the fimbriated ends, cross-clamped, excised and suture ligated along the mesosalpinx. Hemostasis assured. The vaginal cuff was closed with [id number] vicryl sutures with care to incorporate the anterior and posterior peritoneum, as well as the uterosacral ligaments.   A cystoscopy was performed that showed bilateral ureteral jets, confirming ureteral patency.      The patient tolerated the procedure well. Sponge, lap and needle counts were correct. The patient was taken to the recovery area in stable condition.     Tasha March MD

## 2023-12-15 ENCOUNTER — TELEPHONE (OUTPATIENT)
Dept: OBGYN | Facility: CLINIC | Age: 38
End: 2023-12-15

## 2023-12-18 ENCOUNTER — TELEPHONE (OUTPATIENT)
Dept: OBGYN | Facility: CLINIC | Age: 38
End: 2023-12-18

## 2023-12-18 PROCEDURE — 88307 TISSUE EXAM BY PATHOLOGIST: CPT | Mod: 26 | Performed by: PATHOLOGY

## 2024-01-10 ENCOUNTER — MYC MEDICAL ADVICE (OUTPATIENT)
Dept: OBGYN | Facility: CLINIC | Age: 39
End: 2024-01-10

## 2024-01-10 ENCOUNTER — OFFICE VISIT (OUTPATIENT)
Dept: OBGYN | Facility: CLINIC | Age: 39
End: 2024-01-10
Payer: COMMERCIAL

## 2024-01-10 VITALS
TEMPERATURE: 96.4 F | RESPIRATION RATE: 16 BRPM | WEIGHT: 189.2 LBS | HEIGHT: 65 IN | SYSTOLIC BLOOD PRESSURE: 104 MMHG | BODY MASS INDEX: 31.52 KG/M2 | HEART RATE: 66 BPM | DIASTOLIC BLOOD PRESSURE: 70 MMHG

## 2024-01-10 DIAGNOSIS — Z98.890 POSTOPERATIVE STATE: Primary | ICD-10-CM

## 2024-01-10 PROCEDURE — 99024 POSTOP FOLLOW-UP VISIT: CPT | Performed by: OBSTETRICS & GYNECOLOGY

## 2024-01-10 NOTE — PROGRESS NOTES
"[facility] OB/GYN    CC: Postop Visit    S: Pt doing well. She denies any complaints. Off any pain medications. Eating, drinking, urinating and moving bowels without any issues. Vaginal bleeding resolved.     O:   VS: /70 (BP Location: Left arm, Patient Position: Sitting, Cuff Size: Adult Regular)   Pulse 66   Temp (!) 96.4  F (35.8  C)   Resp 16   Ht 1.651 m (5' 5\")   Wt 85.8 kg (189 lb 3.2 oz)   LMP 07/04/2018   BMI 31.48 kg/m    General: NAD  CV: Regular rate, warm and well perfused  Resp: breathing comfortably on room air   Abdomen: soft, non-tender, nondistended  Well healing vaginal cuff intact both digitally and visually  Extremities: non-tender, non-edematous     A/P: Ms. Zamudio yo 4wks post-op s/p TVH, BS  Appropriate postop recovery.   Reviewed pathology - benign.   Reviewed pelvic rest restrictions.   PAP/HPV discontinued from Health Maintenance.     RTC for annual exam or PRN.    Tasha March MD, MD  [facility]    "

## 2024-01-10 NOTE — TELEPHONE ENCOUNTER
Completed paperwork was given to Tasha March MD to review and sign.    -Margaret Lomax  Clinic Station

## 2024-01-16 NOTE — TELEPHONE ENCOUNTER
The competed paperwork was faxed.    The copies are placed up front for a short time; then they will be sent to scan into patient's record.    Emelia Guillory  Patient   [phone]

## 2024-06-23 ENCOUNTER — HEALTH MAINTENANCE LETTER (OUTPATIENT)
Age: 39
End: 2024-06-23

## 2025-04-03 NOTE — ED AVS SNAPSHOT
[facility] Emergency Department  [address] [address]  Phone:  [phone]  Fax:  [phone]                                    Amarilis Espinal   MRN: [id number]    Department:  [facility] Emergency Department   Date of Visit:  10/13/2019           After Visit Summary Signature Page    I have received my discharge instructions, and my questions have been answered. I have discussed any challenges I see with this plan with the nurse or doctor.    ..........................................................................................................................................  Patient/Patient Representative Signature      ..........................................................................................................................................  Patient Representative Print Name and Relationship to Patient    ..................................................               ................................................  Date                                   Time    ..........................................................................................................................................  Reviewed by Signature/Title    ...................................................              ..............................................  Date                                               Time          22EPIC Rev 08/18        PROCEDURE NOTE  Date: 4/3/2025   Name: Priscilla Olivo  YOB: 1994    Procedures      Operative Report    Patient: Priscilla Olivo MRN: [id number]  SSN: [id number]    YOB: 1994  Age: 30 y.o.  Sex: female       Date of Surgery: 4/3/24    Preoperative Diagnosis: Delivery by elective  section [O82] Hx of 2 previous  sections, methadone use    Postoperative Diagnosis:     Surgeons and Role:     * Chip Dorman MD - Primary    Anesthesia: Spinal     Procedure: RCS/BS    Estimated Blood Loss:  see nursing notes    Complications: None    Drains: Suresh    Specimens: cord blood, bilateral tubes    Surgical Findings: Normal Uterus tubes and ovaries, LBI delivered in normal VTX fashion,nuchal around body, 3V Cord, Nl Placenta, Abxs Given, Cord Blood drawn, Peds and Resp present at delivery    Counts: Sponge and needle counts were correct times two.    Signed By:  Chip Dorman MD     April 3, 2025

## 2025-07-12 ENCOUNTER — HEALTH MAINTENANCE LETTER (OUTPATIENT)
Age: 40
End: 2025-07-12

## 2025-07-28 ENCOUNTER — HOSPITAL ENCOUNTER (EMERGENCY)
Facility: CLINIC | Age: 40
Discharge: HOME | End: 2025-07-28
Attending: FAMILY MEDICINE
Payer: COMMERCIAL

## 2025-07-28 ENCOUNTER — APPOINTMENT (OUTPATIENT)
Dept: GENERAL RADIOLOGY | Facility: CLINIC | Age: 40
End: 2025-07-28
Attending: FAMILY MEDICINE
Payer: COMMERCIAL

## 2025-07-28 VITALS
HEIGHT: 65 IN | DIASTOLIC BLOOD PRESSURE: 74 MMHG | TEMPERATURE: 98.1 F | OXYGEN SATURATION: 97 % | RESPIRATION RATE: 19 BRPM | WEIGHT: 175 LBS | SYSTOLIC BLOOD PRESSURE: 106 MMHG | BODY MASS INDEX: 29.16 KG/M2 | HEART RATE: 57 BPM

## 2025-07-28 DIAGNOSIS — R07.9 CHEST PAIN, UNSPECIFIED TYPE: Primary | ICD-10-CM

## 2025-07-28 DIAGNOSIS — F41.1 GAD (GENERALIZED ANXIETY DISORDER): ICD-10-CM

## 2025-07-28 LAB
ALBUMIN SERPL BCG-MCNC: 4.4 G/DL (ref 3.5–5.2)
ALP SERPL-CCNC: 52 U/L (ref 40–150)
ALT SERPL W P-5'-P-CCNC: 14 U/L (ref 0–50)
ANION GAP SERPL CALCULATED.3IONS-SCNC: 11 MMOL/L (ref 7–15)
AST SERPL W P-5'-P-CCNC: 20 U/L (ref 0–45)
BASOPHILS # BLD AUTO: 0.1 10E3/UL (ref 0–0.2)
BASOPHILS NFR BLD AUTO: 2 %
BILIRUB SERPL-MCNC: 1 MG/DL
BUN SERPL-MCNC: 8.6 MG/DL (ref 6–20)
CALCIUM SERPL-MCNC: 9.2 MG/DL (ref 8.8–10.4)
CHLORIDE SERPL-SCNC: 103 MMOL/L (ref 98–107)
CREAT SERPL-MCNC: 0.77 MG/DL (ref 0.51–0.95)
D DIMER PPP FEU-MCNC: 0.46 UG/ML FEU (ref 0–0.5)
EGFRCR SERPLBLD CKD-EPI 2021: >90 ML/MIN/1.73M2
EOSINOPHIL # BLD AUTO: 0.1 10E3/UL (ref 0–0.7)
EOSINOPHIL NFR BLD AUTO: 2 %
ERYTHROCYTE [DISTWIDTH] IN BLOOD BY AUTOMATED COUNT: 11.6 % (ref 10–15)
GLUCOSE SERPL-MCNC: 99 MG/DL (ref 70–99)
HCO3 SERPL-SCNC: 24 MMOL/L (ref 22–29)
HCT VFR BLD AUTO: 41.3 % (ref 35–47)
HGB BLD-MCNC: 14.1 G/DL (ref 11.7–15.7)
HOLD SPECIMEN: NORMAL
HOLD SPECIMEN: NORMAL
IMM GRANULOCYTES # BLD: 0.1 10E3/UL
IMM GRANULOCYTES NFR BLD: 1 %
LIPASE SERPL-CCNC: 25 U/L (ref 13–60)
LYMPHOCYTES # BLD AUTO: 1.3 10E3/UL (ref 0.8–5.3)
LYMPHOCYTES NFR BLD AUTO: 17 %
MCH RBC QN AUTO: 30.3 PG (ref 26.5–33)
MCHC RBC AUTO-ENTMCNC: 34.1 G/DL (ref 31.5–36.5)
MCV RBC AUTO: 89 FL (ref 78–100)
MONOCYTES # BLD AUTO: 0.5 10E3/UL (ref 0–1.3)
MONOCYTES NFR BLD AUTO: 6 %
NEUTROPHILS # BLD AUTO: 5.8 10E3/UL (ref 1.6–8.3)
NEUTROPHILS NFR BLD AUTO: 74 %
NRBC # BLD AUTO: 0 10E3/UL
NRBC BLD AUTO-RTO: 0 /100
NT-PROBNP SERPL-MCNC: <36 PG/ML (ref 0–178)
PLATELET # BLD AUTO: 294 10E3/UL (ref 150–450)
POTASSIUM SERPL-SCNC: 3.9 MMOL/L (ref 3.4–5.3)
PROT SERPL-MCNC: 6.9 G/DL (ref 6.4–8.3)
RBC # BLD AUTO: 4.65 10E6/UL (ref 3.8–5.2)
SODIUM SERPL-SCNC: 138 MMOL/L (ref 135–145)
TROPONIN T SERPL HS-MCNC: <6 NG/L
TSH SERPL DL<=0.005 MIU/L-ACNC: 1.58 UIU/ML (ref 0.3–4.2)
WBC # BLD AUTO: 7.9 10E3/UL (ref 4–11)

## 2025-07-28 PROCEDURE — 93005 ELECTROCARDIOGRAM TRACING: CPT

## 2025-07-28 PROCEDURE — 85004 AUTOMATED DIFF WBC COUNT: CPT | Performed by: FAMILY MEDICINE

## 2025-07-28 PROCEDURE — 258N000003 HC RX IP 258 OP 636: Performed by: FAMILY MEDICINE

## 2025-07-28 PROCEDURE — 80053 COMPREHEN METABOLIC PANEL: CPT | Performed by: FAMILY MEDICINE

## 2025-07-28 PROCEDURE — 96361 HYDRATE IV INFUSION ADD-ON: CPT

## 2025-07-28 PROCEDURE — 84443 ASSAY THYROID STIM HORMONE: CPT | Performed by: FAMILY MEDICINE

## 2025-07-28 PROCEDURE — 71046 X-RAY EXAM CHEST 2 VIEWS: CPT

## 2025-07-28 PROCEDURE — 36415 COLL VENOUS BLD VENIPUNCTURE: CPT | Performed by: FAMILY MEDICINE

## 2025-07-28 PROCEDURE — 96374 THER/PROPH/DIAG INJ IV PUSH: CPT

## 2025-07-28 PROCEDURE — 99284 EMERGENCY DEPT VISIT MOD MDM: CPT | Performed by: FAMILY MEDICINE

## 2025-07-28 PROCEDURE — 250N000011 HC RX IP 250 OP 636: Performed by: FAMILY MEDICINE

## 2025-07-28 PROCEDURE — 250N000013 HC RX MED GY IP 250 OP 250 PS 637: Performed by: FAMILY MEDICINE

## 2025-07-28 PROCEDURE — 85379 FIBRIN DEGRADATION QUANT: CPT | Performed by: FAMILY MEDICINE

## 2025-07-28 PROCEDURE — 99285 EMERGENCY DEPT VISIT HI MDM: CPT | Mod: 25 | Performed by: FAMILY MEDICINE

## 2025-07-28 PROCEDURE — 83690 ASSAY OF LIPASE: CPT | Performed by: FAMILY MEDICINE

## 2025-07-28 PROCEDURE — 84484 ASSAY OF TROPONIN QUANT: CPT | Performed by: FAMILY MEDICINE

## 2025-07-28 PROCEDURE — 93010 ELECTROCARDIOGRAM REPORT: CPT | Performed by: FAMILY MEDICINE

## 2025-07-28 PROCEDURE — 83880 ASSAY OF NATRIURETIC PEPTIDE: CPT | Performed by: FAMILY MEDICINE

## 2025-07-28 PROCEDURE — 85014 HEMATOCRIT: CPT | Performed by: FAMILY MEDICINE

## 2025-07-28 PROCEDURE — 93010 ELECTROCARDIOGRAM REPORT: CPT | Mod: 76 | Performed by: FAMILY MEDICINE

## 2025-07-28 PROCEDURE — 93005 ELECTROCARDIOGRAM TRACING: CPT | Mod: 76

## 2025-07-28 RX ORDER — OLANZAPINE 5 MG/1
5 TABLET, ORALLY DISINTEGRATING ORAL ONCE
Status: COMPLETED | OUTPATIENT
Start: 2025-07-28 | End: 2025-07-28

## 2025-07-28 RX ORDER — DEXTROSE, SODIUM CHLORIDE, SODIUM LACTATE, POTASSIUM CHLORIDE, AND CALCIUM CHLORIDE 5; .6; .31; .03; .02 G/100ML; G/100ML; G/100ML; G/100ML; G/100ML
1000 INJECTION, SOLUTION INTRAVENOUS ONCE
Status: DISCONTINUED | OUTPATIENT
Start: 2025-07-28 | End: 2025-07-28

## 2025-07-28 RX ORDER — KETOROLAC TROMETHAMINE 15 MG/ML
15 INJECTION, SOLUTION INTRAMUSCULAR; INTRAVENOUS ONCE
Status: COMPLETED | OUTPATIENT
Start: 2025-07-28 | End: 2025-07-28

## 2025-07-28 RX ORDER — DEXTROSE MONOHYDRATE AND SODIUM CHLORIDE 5; .9 G/100ML; G/100ML
INJECTION, SOLUTION INTRAVENOUS CONTINUOUS
Status: DISCONTINUED | OUTPATIENT
Start: 2025-07-28 | End: 2025-07-28 | Stop reason: HOSPADM

## 2025-07-28 RX ORDER — LORAZEPAM 1 MG/1
1 TABLET ORAL ONCE
Status: COMPLETED | OUTPATIENT
Start: 2025-07-28 | End: 2025-07-28

## 2025-07-28 RX ADMIN — OLANZAPINE 5 MG: 5 TABLET, ORALLY DISINTEGRATING ORAL at 13:02

## 2025-07-28 RX ADMIN — DEXTROSE AND SODIUM CHLORIDE: 5; .9 INJECTION, SOLUTION INTRAVENOUS at 13:02

## 2025-07-28 RX ADMIN — KETOROLAC TROMETHAMINE 15 MG: 15 INJECTION, SOLUTION INTRAMUSCULAR; INTRAVENOUS at 12:29

## 2025-07-28 RX ADMIN — LORAZEPAM 1 MG: 1 TABLET ORAL at 12:29

## 2025-07-28 ASSESSMENT — ACTIVITIES OF DAILY LIVING (ADL)
ADLS_ACUITY_SCORE: 41

## 2025-07-28 ASSESSMENT — COLUMBIA-SUICIDE SEVERITY RATING SCALE - C-SSRS
2. HAVE YOU ACTUALLY HAD ANY THOUGHTS OF KILLING YOURSELF IN THE PAST MONTH?: NO
1. IN THE PAST MONTH, HAVE YOU WISHED YOU WERE DEAD OR WISHED YOU COULD GO TO SLEEP AND NOT WAKE UP?: NO
6. HAVE YOU EVER DONE ANYTHING, STARTED TO DO ANYTHING, OR PREPARED TO DO ANYTHING TO END YOUR LIFE?: NO

## 2025-07-28 NOTE — ED PROVIDER NOTES
History     Chief Complaint   Patient presents with    Anxiety     cp    Chest Pain     HPI  Amarilis Zamudio is a 40 year old female who presents with chest pain since June when her  left her.  Nighttime is worse no associated nausea vomiting or shortness of breath.  Anxiety.  Presents with episodic chest pain without significant radiation primarily upper chest left side.  Possible exertional possibly with pleuritic.  No significant dyspnea.  No nausea or vomiting.  Tolerating food and fluids.  Most of her symptoms seem to start at the time that her  left her.  She is quite anxious about this and has had prolonged grief related to the loss of the relationship.  She is been seen at another outpatient emergency department started on Paxil and on Ativan.  She is pending follow-up with mental health and has already been established with them for some time.  Previously suicidal but not suicidal now.  Her prior suicidality was about 3 months ago.  She has no significant new medications.  No known PE or VTE risk but she does have some pleuritic pain.  Frequent palpitations.    Allergies:  Allergies   Allergen Reactions    Adhesive Tape Dermatitis    Beef-Derived Drug Products GI Disturbance    Bees Hives     Swelling      Lactose Diarrhea    Morphine Nausea and Vomiting    Soy Allergy (Obsolete) GI Disturbance     IBS        Problem List:    Patient Active Problem List    Diagnosis Date Noted    Anxiety and depression 01/18/2022     Priority: Medium    Myopia 01/18/2022     Priority: Medium    Knee pain 01/18/2022     Priority: Medium    AR (allergic rhinitis) 08/01/2013     Priority: Medium    Lactose intolerance 08/01/2013     Priority: Medium    Lumbago 06/08/2011     Priority: Medium    Dysmenorrhea 10/26/2010     Priority: Medium    Excessive or frequent menstruation 07/12/2010     Priority: Medium        Past Medical History:    Past Medical History:   Diagnosis Date    Anxiety and depression  01/18/2022    Arthritis     Cellulitis and abscess 01/18/2022    Chickenpox     Esophageal reflux     Headache 07/12/2010    Infection due to 2019 novel coronavirus 11/2021    Migraine headache 07/12/2010    Other specified anemias     Plantar fasciitis     PONV (postoperative nausea and vomiting)        Past Surgical History:    Past Surgical History:   Procedure Laterality Date    ARTHROSCOPY KNEE RT/LT  01/2005    Left knee surgery    AS HYSTEROS W PERMANENT FALLOPAIN IMPLANT  2014    [facility]    DILATION AND CURETTAGE SUCTION  12/30/2010    DILATION AND CURETTAGE SUCTION performed by HENRIQUE TATE at [facility]    DILATION AND CURETTAGE, HYSTEROSCOPY, ABLATE ENDOMETRIUM, COMBINED N/A 07/18/2018    Procedure: COMBINED DILATION AND CURETTAGE, HYSTEROSCOPY, ABLATE ENDOMETRIUM;  Dilation and curettage, Hysteroscopy, endometrial ablation. ([place]);  Surgeon: Henrique Tate MD;  Location: [facility] OR    HYSTERECTOMY, VAGINAL, WITH SALPINGECTOMY, CYSTOSCOPY Bilateral 12/14/2023    Procedure: Transvaginal hysterectomy, bilateral salpingectomy, cystoscopy;  Surgeon: Tasha March MD;  Location: [facility] OR    SHOULDER SURGERY Right 2019    TONSILLECTOMY Bilateral 07/25/2016    Procedure: TONSILLECTOMY;  Surgeon: Bradley Medrano MD;  Location:  OR    [facility] ORAL SURGERY PROCEDURE  age 18    Paola teeth       Family History:    Family History   Problem Relation Age of Onset    Hypertension Mother     Gynecology Mother         endometriosis    Hearing Loss Mother     Other - See Comments Mother         Lumpectomy    Obesity Father     Sleep Apnea Father     No Known Problems Brother     No Known Problems Brother     Breast Cancer Maternal Grandmother 55    Thyroid Disease Maternal Grandmother     Arthritis Maternal Grandmother     Alzheimer Disease Maternal Grandmother     Gynecology Maternal Grandmother         endometriosis    Osteoporosis Maternal Grandmother     Heart Disease Maternal Grandfather         MI     "Cancer Maternal Grandfather         leukemia    Cardiovascular Maternal Grandfather     Hypertension Maternal Grandfather     Gynecology Paternal Grandmother         endometriosis    Eye Disorder Paternal Grandmother     Diabetes Paternal Grandfather     Depression Paternal Grandfather     Diverticulitis Paternal Grandfather     Hyperlipidemia Paternal Grandfather     Cancer - colorectal No family hx of        Social History:  Marital Status:   [2]  Social History     Tobacco Use    Smoking status: Never    Smokeless tobacco: Never   Vaping Use    Vaping status: Never Used   Substance Use Topics    Alcohol use: Yes     Comment: rare    Drug use: No        Medications:    ASTRAGALUS PO  co-enzyme Q-10 100 MG CAPS capsule  Docosahexaenoic Acid (DHA PO)  EPINEPHrine (EPIPEN/ADRENACLICK/OR ANY BX GENERIC EQUIV) 0.3 MG/0.3ML injection 2-pack  Ferrous Sulfate (IRON SUPPLEMENT PO)  GINSENG PO  ibuprofen (ADVIL/MOTRIN) 800 MG tablet  Magnesium Gluconate (MAGNESIUM 27 PO)  multivitamin, therapeutic (THERA-VIT) TABS tablet  Omega-3 Fatty Acids (OMEGA-3 FISH OIL PO)  OVER-THE-COUNTER  oxyCODONE (ROXICODONE) 5 MG tablet  Probiotic Product (PRO-BIOTIC BLEND PO)  QUERCETIN PO  senna-docusate (SENOKOT-S/PERICOLACE) 8.6-50 MG tablet  UNABLE TO FIND  UNABLE TO FIND          Review of Systems    ROS:  5 point ROS negative except as noted above in HPI, including Gen., Resp., CV, GI &  system review.    Physical Exam   BP: 116/79  Pulse: 56  Temp: 98.1  F (36.7  C)  Height: 165.1 cm (5' 5\")  Weight: 79.4 kg (175 lb)      Physical Exam  Constitutional:       General: She is in acute distress.      Appearance: She is not diaphoretic.   Eyes:      Conjunctiva/sclera: Conjunctivae normal.   Cardiovascular:      Rate and Rhythm: Normal rate and regular rhythm.      Heart sounds: No murmur heard.  Pulmonary:      Effort: No respiratory distress.      Breath sounds: No stridor. No wheezing or rhonchi.   Abdominal:      General: " Abdomen is flat. There is no distension.      Palpations: Abdomen is soft. There is no mass.      Tenderness: There is no abdominal tenderness. There is no guarding.   Musculoskeletal:      Right lower leg: No edema.      Left lower leg: No edema.   Skin:     Coloration: Skin is not pale.      Findings: No rash.   Neurological:      General: No focal deficit present.      Mental Status: She is alert and oriented to person, place, and time.      Motor: No weakness.             ED Course        Procedures                EKG Interpretation:      Interpreted by Fabrizio Oakley MD  EKG done at 0831 hrs. demonstrates a sinus rhythm 51 bpm normal axis.  There is no ST change no T wave changes.  Normal R progression no Q waves.  Normal intervals.  Normal conduction.  No ectopy.  Impression sinus rhythm 51 bpm no acute change             EKG Interpretation:      Interpreted by Fabrizio Oakley MD  EKG done at 1225 hrs. demonstrates a sinus rhythm at 56 bpm normal axis.  There is no ST change.  No T wave changes.  Normal R progression and no Q waves.  Normal intervals.  Normal conduction.  No ectopy.  Impression sinus rhythm 56 bpm no acute change.    Critical Care time:  none     None         Recent Results (from the past 24 hours)   EKG 12-lead, tracing only   Result Value Ref Range    Systolic Blood Pressure  mmHg    Diastolic Blood Pressure  mmHg    Ventricular Rate 51 BPM    Atrial Rate 51 BPM    NJ Interval 140 ms    QRS Duration 80 ms     ms    QTc 416 ms    P Axis 46 degrees    R AXIS 50 degrees    T Axis 41 degrees    Interpretation ECG       Sinus bradycardia  Otherwise normal ECG  No previous ECGs available     [facility]ola Draw    Narrative    The following orders were created for panel order [facility] Draw.  Procedure                               Abnormality         Status                     ---------                               -----------         ------                     Extra Green Top  (Lithium...[[id number]]                      Final result               Extra Purple Top Tube[[id number]]                           Final result                 Please view results for these tests on the individual orders.   Extra Green Top (Lithium Heparin) Tube   Result Value Ref Range    Hold Specimen JIC    Extra Purple Top Tube   Result Value Ref Range    Hold Specimen JIC    CBC with Platelets and Differential (Limited Occurrences)    Narrative    The following orders were created for panel order CBC with Platelets and Differential (Limited Occurrences).  Procedure                               Abnormality         Status                     ---------                               -----------         ------                     CBC with platelets and ...[[id number]]                      Final result                 Please view results for these tests on the individual orders.   Comprehensive Metabolic Panel (Limited Occurrences)   Result Value Ref Range    Sodium 138 135 - 145 mmol/L    Potassium 3.9 3.4 - 5.3 mmol/L    Carbon Dioxide (CO2) 24 22 - 29 mmol/L    Anion Gap 11 7 - 15 mmol/L    Urea Nitrogen 8.6 6.0 - 20.0 mg/dL    Creatinine 0.77 0.51 - 0.95 mg/dL    GFR Estimate >90 >60 mL/min/1.73m2    Calcium 9.2 8.8 - 10.4 mg/dL    Chloride 103 98 - 107 mmol/L    Glucose 99 70 - 99 mg/dL    Alkaline Phosphatase 52 40 - 150 U/L    AST 20 0 - 45 U/L    ALT 14 0 - 50 U/L    Protein Total 6.9 6.4 - 8.3 g/dL    Albumin 4.4 3.5 - 5.2 g/dL    Bilirubin Total 1.0 <=1.2 mg/dL   TSH with free T4 reflex   Result Value Ref Range    TSH 1.58 0.30 - 4.20 uIU/mL   Troponin T, High Sensitivity   Result Value Ref Range    Troponin T, High Sensitivity <6 <=14 ng/L   CBC with platelets and differential   Result Value Ref Range    WBC Count 7.9 4.0 - 11.0 10e3/uL    RBC Count 4.65 3.80 - 5.20 10e6/uL    Hemoglobin 14.1 11.7 - 15.7 g/dL    Hematocrit 41.3 35.0 - 47.0 %    MCV 89 78 - 100 fL    MCH 30.3 26.5 - 33.0 pg    MCHC 34.1  31.5 - 36.5 g/dL    RDW 11.6 10.0 - 15.0 %    Platelet Count 294 150 - 450 10e3/uL    % Neutrophils 74 %    % Lymphocytes 17 %    % Monocytes 6 %    % Eosinophils 2 %    % Basophils 2 %    % Immature Granulocytes 1 %    NRBCs per 100 WBC 0 <1 /100    Absolute Neutrophils 5.8 1.6 - 8.3 10e3/uL    Absolute Lymphocytes 1.3 0.8 - 5.3 10e3/uL    Absolute Monocytes 0.5 0.0 - 1.3 10e3/uL    Absolute Eosinophils 0.1 0.0 - 0.7 10e3/uL    Absolute Basophils 0.1 0.0 - 0.2 10e3/uL    Absolute Immature Granulocytes 0.1 <=0.4 10e3/uL    Absolute NRBCs 0.0 10e3/uL   NT-proBNP   Result Value Ref Range    NT-proBNP <36 0 - 178 pg/mL   Lipase   Result Value Ref Range    Lipase 25 13 - 60 U/L   D dimer quantitative   Result Value Ref Range    D-Dimer Quantitative 0.46 0.00 - 0.50 ug/mL FEU    Narrative    This D-dimer assay is intended for use in conjunction with a clinical pretest probability assessment model to exclude pulmonary embolism (PE) and deep venous thrombosis (DVT) in outpatients suspected of PE or DVT. The cut-off value is 0.50 ug/mL FEU.   Chest XR,  PA & LAT    Narrative    EXAM: XR CHEST 2 VIEWS  LOCATION: [facility]  DATE: 7/28/2025    INDICATION: chest pain  COMPARISON: None.      Impression    IMPRESSION: No focal consolidation, pleural effusion or pneumothorax. Cardiomediastinal silhouette is unremarkable.   EKG 12 lead   Result Value Ref Range    Systolic Blood Pressure  mmHg    Diastolic Blood Pressure  mmHg    Ventricular Rate 56 BPM    Atrial Rate 56 BPM    MA Interval 134 ms    QRS Duration 78 ms     ms    QTc 434 ms    P Axis 54 degrees    R AXIS 37 degrees    T Axis 29 degrees    Interpretation ECG       Sinus bradycardia  Otherwise normal ECG  When compared with ECG of 28-Jul-2025 08:31, (unconfirmed)  No significant change was found         Medications - No data to display            Assessments & Plan (with Medical Decision Making)   JELENA Zamudio is a 40  year old female - Anxiety and depression related to her  leaving a month ago accompanied by friend/family.  She has several findings that are suggestive of anxiety but cannot exclude cardiopulmonary cause and will therefore obtain testing.  She did have pleuritic pain possible exertional symptoms therefore full cardiopulmonary eval is been performed  Findings are reassuring and I went back in to discuss these with the patient but she was experiencing increased chest pain, anxious and was feeling paresthesias in her extremities.  She was given Ativan Toradol.  Then experienced worsening symptoms.  Given IV fluids and oral Zyprexa and she is resting comfortably after this.  Plan for discharge home once symptoms shantal continued home management on Paxil and Ativan.  No findings of serotonin syndrome related to her being recently started on Paxil.      Patient awoke and she was much more comfortable.  She appeared calm.  We discussed following up with her primary provider as scheduled tomorrow.  Findings today are reassuring and no red flag findings.      I have reviewed the nursing notes.    I have reviewed the findings, diagnosis, plan and need for follow up with the patient.           Medical Decision Making  The patient's presentation was of moderate complexity (an acute illness with systemic symptoms).    The patient's evaluation involved:  ordering and/or review of 3+ test(s) in this encounter (see separate area of note for details)    The patient's management necessitated moderate risk (prescription drug management including medications given in the ED).        New Prescriptions    No medications on file       Final diagnoses:   Chest pain, unspecified type - no serious findings and reassuring evaluation.    JEREMIAH (generalized anxiety disorder) - Continue home paxil and ativan. follow-up mental health this week and primary care.  return immed for suicidality       7/28/2025   [facility]  EMERGENCY DEPT       Fabrizio Oakley MD  07/28/25 0268

## 2025-07-28 NOTE — DISCHARGE INSTRUCTIONS
ICD-10-CM    1. Chest pain, unspecified type  R07.9 ED To Primary Care Referral    no serious findings and reassuring evaluation.       2. JEREMIAH (generalized anxiety disorder)  F41.1 ED To Primary Care Referral    Continue home paxil and ativan. follow-up mental health this week and primary care.  return immed for suicidality

## 2025-07-28 NOTE — ED NOTES
Pt called out saying she is dizzy and SOB, she is having a reaction to the toradol. Pt's visitor said she also had ativan. Will notify MD.

## 2025-07-28 NOTE — ED TRIAGE NOTES
Pt c/o cp since end of June when her  left her.  Last night pain worse.  No n/v.  No soa . Also suffers from anxiety.     Triage Assessment (Adult)       Row Name 07/28/25 0828          Triage Assessment    Airway WDL WDL        Respiratory WDL    Respiratory WDL WDL        Cognitive/Neuro/Behavioral WDL    Cognitive/Neuro/Behavioral WDL WDL

## 2025-07-29 LAB
ATRIAL RATE - MUSE: 51 BPM
ATRIAL RATE - MUSE: 56 BPM
DIASTOLIC BLOOD PRESSURE - MUSE: NORMAL MMHG
DIASTOLIC BLOOD PRESSURE - MUSE: NORMAL MMHG
INTERPRETATION ECG - MUSE: NORMAL
INTERPRETATION ECG - MUSE: NORMAL
P AXIS - MUSE: 46 DEGREES
P AXIS - MUSE: 54 DEGREES
PR INTERVAL - MUSE: 134 MS
PR INTERVAL - MUSE: 140 MS
QRS DURATION - MUSE: 78 MS
QRS DURATION - MUSE: 80 MS
QT - MUSE: 450 MS
QT - MUSE: 452 MS
QTC - MUSE: 416 MS
QTC - MUSE: 434 MS
R AXIS - MUSE: 37 DEGREES
R AXIS - MUSE: 50 DEGREES
SYSTOLIC BLOOD PRESSURE - MUSE: NORMAL MMHG
SYSTOLIC BLOOD PRESSURE - MUSE: NORMAL MMHG
T AXIS - MUSE: 29 DEGREES
T AXIS - MUSE: 41 DEGREES
VENTRICULAR RATE- MUSE: 51 BPM
VENTRICULAR RATE- MUSE: 56 BPM

## (undated) DEVICE — PACK LAPAROSCOPY/PELVISCOPY STD

## (undated) DEVICE — DEVICE ENDOMETRIAL ABLATION SYS MINERVA HANDPIECE MIN9770

## (undated) DEVICE — LUBRICATING JELLY 4.25OZ

## (undated) DEVICE — SOL WATER IRRIG 1000ML BOTTLE 07139-09

## (undated) DEVICE — TUBING CYSTO/BLADDER IRRIG SET 80" 06544-01

## (undated) DEVICE — SUCTION TIP YANKAUER STR K87

## (undated) DEVICE — TUBING SUCTION 12"X1/4" N612

## (undated) DEVICE — STOCKING SLEEVE COMPRESSION CALF MED

## (undated) DEVICE — PREP CHLORHEXIDINE 4% 4OZ (HIBICLENS) 57504

## (undated) DEVICE — SUCTION MANIFOLD NEPTUNE 2 SYS 1 PORT 702-025-000

## (undated) DEVICE — SOL NACL 0.9% IRRIG 1000ML BOTTLE 07138-09

## (undated) DEVICE — CATH INTERMITTENT CLEAN-CATH FEMALE 14FR 6" VINYL LF 420614

## (undated) DEVICE — CATH TRAY FOLEY SURESTEP 16FR W/URINE MTR STATLK LF A303416A

## (undated) DEVICE — DILATOR CERVICAL OS FINDER TAPER 2-4MMX21.5CM 1176

## (undated) DEVICE — SOL NACL 0.9% INJ 1000ML BAG 07983-09

## (undated) DEVICE — SU VICRYL 0 CT-2 CR 8X18" J727D

## (undated) DEVICE — SU VICRYL 0 CT-1 36" J946H

## (undated) DEVICE — PAD PERI INDIV WRAP 11" 2022

## (undated) DEVICE — ESU PENCIL W/COATED BLADE E2450H

## (undated) DEVICE — GLOVE PROTEXIS MICRO 5.5  2D73PM55

## (undated) DEVICE — GLOVE BIOGEL PI ULTRATOUCH G SZ 7.0 42170

## (undated) DEVICE — DECANTER VIAL 2006S

## (undated) DEVICE — NDL SPINAL 22GA 3.5" QUINCKE 405181

## (undated) DEVICE — GLOVE BIOGEL PI MICRO INDICATOR UNDERGLOVE SZ 7.0 48970

## (undated) DEVICE — GLOVE PROTEXIS BLUE W/NEU-THERA 6.0  2D73EB60

## (undated) DEVICE — GOWN LG DISP 9515

## (undated) DEVICE — DRSG TELFA 3X8" 1238

## (undated) RX ORDER — GLYCOPYRROLATE 0.2 MG/ML
INJECTION, SOLUTION INTRAMUSCULAR; INTRAVENOUS
Status: DISPENSED
Start: 2023-12-14

## (undated) RX ORDER — FENTANYL CITRATE 50 UG/ML
INJECTION, SOLUTION INTRAMUSCULAR; INTRAVENOUS
Status: DISPENSED
Start: 2023-12-14

## (undated) RX ORDER — FENTANYL CITRATE 50 UG/ML
INJECTION, SOLUTION INTRAMUSCULAR; INTRAVENOUS
Status: DISPENSED
Start: 2018-07-18

## (undated) RX ORDER — PROPOFOL 10 MG/ML
INJECTION, EMULSION INTRAVENOUS
Status: DISPENSED
Start: 2023-12-14

## (undated) RX ORDER — PHENAZOPYRIDINE HYDROCHLORIDE 200 MG/1
TABLET, FILM COATED ORAL
Status: DISPENSED
Start: 2023-12-14

## (undated) RX ORDER — OXYCODONE HYDROCHLORIDE 5 MG/1
TABLET ORAL
Status: DISPENSED
Start: 2023-12-14

## (undated) RX ORDER — DEXAMETHASONE SODIUM PHOSPHATE 4 MG/ML
INJECTION, SOLUTION INTRA-ARTICULAR; INTRALESIONAL; INTRAMUSCULAR; INTRAVENOUS; SOFT TISSUE
Status: DISPENSED
Start: 2018-07-18

## (undated) RX ORDER — LIDOCAINE HYDROCHLORIDE 10 MG/ML
INJECTION, SOLUTION EPIDURAL; INFILTRATION; INTRACAUDAL; PERINEURAL
Status: DISPENSED
Start: 2018-07-18

## (undated) RX ORDER — PROPOFOL 10 MG/ML
INJECTION, EMULSION INTRAVENOUS
Status: DISPENSED
Start: 2018-07-18

## (undated) RX ORDER — CEFAZOLIN SODIUM/WATER 2 G/20 ML
SYRINGE (ML) INTRAVENOUS
Status: DISPENSED
Start: 2023-12-14

## (undated) RX ORDER — OXYCODONE HCL 10 MG/1
TABLET, FILM COATED, EXTENDED RELEASE ORAL
Status: DISPENSED
Start: 2018-07-18

## (undated) RX ORDER — EPHEDRINE SULFATE 50 MG/ML
INJECTION, SOLUTION INTRAMUSCULAR; INTRAVENOUS; SUBCUTANEOUS
Status: DISPENSED
Start: 2023-12-14

## (undated) RX ORDER — KETOROLAC TROMETHAMINE 30 MG/ML
INJECTION, SOLUTION INTRAMUSCULAR; INTRAVENOUS
Status: DISPENSED
Start: 2018-07-18

## (undated) RX ORDER — GABAPENTIN 300 MG/1
CAPSULE ORAL
Status: DISPENSED
Start: 2023-12-14

## (undated) RX ORDER — HYDROXYZINE HYDROCHLORIDE 25 MG/1
TABLET, FILM COATED ORAL
Status: DISPENSED
Start: 2023-12-14

## (undated) RX ORDER — ONDANSETRON 2 MG/ML
INJECTION INTRAMUSCULAR; INTRAVENOUS
Status: DISPENSED
Start: 2018-07-18

## (undated) RX ORDER — ACETAMINOPHEN 325 MG/1
TABLET ORAL
Status: DISPENSED
Start: 2023-12-14

## (undated) RX ORDER — GLYCOPYRROLATE 0.2 MG/ML
INJECTION, SOLUTION INTRAMUSCULAR; INTRAVENOUS
Status: DISPENSED
Start: 2018-07-18

## (undated) RX ORDER — LIDOCAINE HYDROCHLORIDE AND EPINEPHRINE 10; 10 MG/ML; UG/ML
INJECTION, SOLUTION INFILTRATION; PERINEURAL
Status: DISPENSED
Start: 2023-12-14

## (undated) RX ORDER — HYDROMORPHONE HCL IN WATER/PF 6 MG/30 ML
PATIENT CONTROLLED ANALGESIA SYRINGE INTRAVENOUS
Status: DISPENSED
Start: 2023-12-14

## (undated) RX ORDER — ACETAMINOPHEN 325 MG/1
TABLET ORAL
Status: DISPENSED
Start: 2018-07-18